# Patient Record
Sex: MALE | Race: WHITE
[De-identification: names, ages, dates, MRNs, and addresses within clinical notes are randomized per-mention and may not be internally consistent; named-entity substitution may affect disease eponyms.]

---

## 2018-05-08 PROBLEM — Z00.00 ENCOUNTER FOR PREVENTIVE HEALTH EXAMINATION: Status: ACTIVE | Noted: 2018-05-08

## 2018-05-23 ENCOUNTER — OTHER (OUTPATIENT)
Age: 73
End: 2018-05-23

## 2018-05-31 ENCOUNTER — APPOINTMENT (OUTPATIENT)
Dept: ORTHOPEDIC SURGERY | Facility: CLINIC | Age: 73
End: 2018-05-31
Payer: MEDICARE

## 2018-05-31 VITALS
TEMPERATURE: 98.1 F | SYSTOLIC BLOOD PRESSURE: 111 MMHG | WEIGHT: 253 LBS | HEART RATE: 93 BPM | BODY MASS INDEX: 34.27 KG/M2 | DIASTOLIC BLOOD PRESSURE: 65 MMHG | HEIGHT: 72 IN

## 2018-05-31 DIAGNOSIS — Z80.9 FAMILY HISTORY OF MALIGNANT NEOPLASM, UNSPECIFIED: ICD-10-CM

## 2018-05-31 DIAGNOSIS — Z82.61 FAMILY HISTORY OF ARTHRITIS: ICD-10-CM

## 2018-05-31 DIAGNOSIS — Z87.39 PERSONAL HISTORY OF OTHER DISEASES OF THE MUSCULOSKELETAL SYSTEM AND CONNECTIVE TISSUE: ICD-10-CM

## 2018-05-31 DIAGNOSIS — Z78.9 OTHER SPECIFIED HEALTH STATUS: ICD-10-CM

## 2018-05-31 DIAGNOSIS — M25.559 PAIN IN UNSPECIFIED HIP: ICD-10-CM

## 2018-05-31 DIAGNOSIS — Z87.891 PERSONAL HISTORY OF NICOTINE DEPENDENCE: ICD-10-CM

## 2018-05-31 PROCEDURE — 73502 X-RAY EXAM HIP UNI 2-3 VIEWS: CPT | Mod: RT

## 2018-05-31 PROCEDURE — 99204 OFFICE O/P NEW MOD 45 MIN: CPT

## 2018-07-30 ENCOUNTER — RX RENEWAL (OUTPATIENT)
Age: 73
End: 2018-07-30

## 2018-09-10 ENCOUNTER — RX RENEWAL (OUTPATIENT)
Age: 73
End: 2018-09-10

## 2019-02-21 ENCOUNTER — OTHER (OUTPATIENT)
Age: 74
End: 2019-02-21

## 2019-02-25 ENCOUNTER — FORM ENCOUNTER (OUTPATIENT)
Age: 74
End: 2019-02-25

## 2019-02-26 ENCOUNTER — OUTPATIENT (OUTPATIENT)
Dept: OUTPATIENT SERVICES | Facility: HOSPITAL | Age: 74
LOS: 1 days | End: 2019-02-26
Payer: MEDICARE

## 2019-02-26 ENCOUNTER — APPOINTMENT (OUTPATIENT)
Dept: ORTHOPEDIC SURGERY | Facility: CLINIC | Age: 74
End: 2019-02-26
Payer: MEDICARE

## 2019-02-26 ENCOUNTER — APPOINTMENT (OUTPATIENT)
Dept: ULTRASOUND IMAGING | Facility: CLINIC | Age: 74
End: 2019-02-26
Payer: MEDICARE

## 2019-02-26 VITALS
WEIGHT: 258 LBS | HEIGHT: 72 IN | BODY MASS INDEX: 34.95 KG/M2 | SYSTOLIC BLOOD PRESSURE: 154 MMHG | TEMPERATURE: 97.9 F | HEART RATE: 91 BPM | DIASTOLIC BLOOD PRESSURE: 77 MMHG

## 2019-02-26 DIAGNOSIS — M16.11 UNILATERAL PRIMARY OSTEOARTHRITIS, RIGHT HIP: ICD-10-CM

## 2019-02-26 PROCEDURE — 20611 DRAIN/INJ JOINT/BURSA W/US: CPT

## 2019-02-26 PROCEDURE — 20611 DRAIN/INJ JOINT/BURSA W/US: CPT | Mod: RT

## 2019-02-26 PROCEDURE — 99214 OFFICE O/P EST MOD 30 MIN: CPT

## 2019-02-26 PROCEDURE — 73502 X-RAY EXAM HIP UNI 2-3 VIEWS: CPT

## 2019-02-26 NOTE — PHYSICAL EXAM
[de-identified] : The patient appears well nourished  and in no apparent distress.  The patient is alert and oriented to person, place, and time.   Affect and mood appear normal. The head is normocephalic and atraumatic.  The eyes reveal normal sclera and extra ocular muscles are intact. The tongue is midline with no apparent lesions.  Skin shows normal turgor with no evidence of eczema or psoriasis.  No respiratory distress noted.  Sensation grossly intact.\par   [de-identified] : Exam of the right hip shows groin pain with attempting full extension of the leg, 10 degree flexion contracture, internal rotation is neutral and painful, external rotation is 60 degrees. 5/5 motor strength bilaterally distally. Sensation intact distally.  [de-identified] : Xray- AP pelvis and 2 views right hip shows moderate arthritis of the right hip.

## 2019-02-26 NOTE — DISCUSSION/SUMMARY
[de-identified] : The patient is a 73 year old male with moderate arthritis of the right hip. Conservative options were discussed. He was sent for ultrasound guided cortisone injection for both diagnostic and therapeutic purposes. He was recommended to keep a log of pain relief following the ultrasound guided cortisone injection. He was referred to Dr. Cho for a lumbar spine evaluation as he has some leg symptoms that may be related to spinal stenosis. He may be a future candidate for right hip replacement once conservative options have been exhausted. He will follow up 2 weeks following the ultrasound guided cortisone injection.

## 2019-02-26 NOTE — HISTORY OF PRESENT ILLNESS
[de-identified] : The patient is a 73 year old male being seen for evaluation of his right hip. He was originally seen in the office in May of 2018 for bilateral hip pain. He was diagnosed with osteoarthritis of both hips. He was treated conservatively with physical therapy. He did feel some mild relief with therapy. He continues to do his home exercise program and stretching. He discontinued pickle ball. Although he continues on Coumadin he does take Advil intermittently which helps symptoms. He is noting persistent pain most notable to the right groin. This is with weightbearing activities, especially walking. He does have some difficulty donning/doffing shoes and socks. He also reports bilateral quadriceps pain when transferring from sitting to standing. He presents today to discuss further treatment options.

## 2019-02-26 NOTE — ADDENDUM
[FreeTextEntry1] : This note was authored by Fox Cardoza working as a medical scribe for Dr. Malcolm Sage. The note was reviewed, edited, and revised by Dr. Malcolm Sage whom is in agreement with the exam findings, imaging findings, and treatment plan. 02/26/2019.

## 2019-04-30 ENCOUNTER — APPOINTMENT (OUTPATIENT)
Dept: ORTHOPEDIC SURGERY | Facility: CLINIC | Age: 74
End: 2019-04-30
Payer: MEDICARE

## 2019-04-30 VITALS
BODY MASS INDEX: 34.95 KG/M2 | DIASTOLIC BLOOD PRESSURE: 78 MMHG | SYSTOLIC BLOOD PRESSURE: 141 MMHG | WEIGHT: 258 LBS | HEIGHT: 72 IN | HEART RATE: 86 BPM

## 2019-04-30 DIAGNOSIS — M47.816 SPONDYLOSIS W/OUT MYELOPATHY OR RADICULOPATHY, LUMBAR REGION: ICD-10-CM

## 2019-04-30 DIAGNOSIS — Z86.79 PERSONAL HISTORY OF OTHER DISEASES OF THE CIRCULATORY SYSTEM: ICD-10-CM

## 2019-04-30 PROCEDURE — 99214 OFFICE O/P EST MOD 30 MIN: CPT

## 2019-04-30 PROCEDURE — 72100 X-RAY EXAM L-S SPINE 2/3 VWS: CPT

## 2019-04-30 NOTE — PHYSICAL EXAM
[Poor Appearance] : well-appearing [Acute Distress] : not in acute distress [de-identified] : CONSTITUTIONAL: The patient is a very pleasant individual who is well-nourished and who appears stated age.\par PSYCHIATRIC: The patient is alert and oriented X 3 and in no apparent distress, and participates with orthopedic evaluation well.\par HEAD: Atraumatic and is nonsyndromic in appearance.\par EENT: No visible thyromegaly, EOMI.\par RESPIRATORY: Respiratory rate is regular, not dyspneic on examination.\par LYMPHATICS: There is no inguinal lymphadenopathy\par INTEGUMENTARY: Skin is clean, dry, and intact about the bilateral lower extremities and lumbar spine.\par VASCULAR: There is brisk capillary refill about the bilateral lower extremities.\par NEUROLOGIC: There are no pathologic reflexes. There is no decrease in sensation of the bilateral lower extremities on Wartenberg pinwheel examination. Deep tendon reflexes are well maintained at 2+/4 of the bilateral lower extremities and are symmetric.\par MUSCULOSKELETAL: There is no visible muscular atrophy. Manual motor strength is well maintained in the bilateral lower extremities. Range of motion of lumbar spine is well maintained. The patient ambulates in a non-myelopathic manner. Negative tension sign and straight leg raise bilaterally. Quad extension, ankle dorsiflexion, EHL, plantar flexion, and ankle eversion are well preserved. Normal secondary orthopaedic exam of  knees and ankles \par \par Mechanically orientated low back pain. right hip findings / internal derangement characteristics. piriformis signs and symptoms as well as right greater trochanteric bursitis [de-identified] : X-ray of the lumbar spine taken today shows multiple levels of lumbar DDD.

## 2019-04-30 NOTE — REVIEW OF SYSTEMS
[Joint Pain] : joint pain [Negative] : Heme/Lymph [Feeling Tired] : fatigue [Urinary Frequency] : urinary frequency [Fever] : no fever [Chills] : no chills [Cough] : no cough [SOB on Exertion] : no shortness of breath on exertion

## 2019-04-30 NOTE — DISCUSSION/SUMMARY
[de-identified] : I have recommended that the pt continue with a conservative treatment plan. I think a large amount of his pain is coming from his hip, as he had great relief of his signs and symptoms from a right hip injection. Pt has been referred to physical therapy for decreased pain modalities, core strengthening modalities, soft tissue modalities, and physical modalities. I recommending continuing to follow up with pain management for hip injections. We spoke about the benefits to light aerobic conditioning as well. The patient will follow up in 6-8 weeks for a repeat clinical assessment. If his signs and symptoms persist, I will order a lumbar MRI.

## 2019-04-30 NOTE — ADDENDUM
[FreeTextEntry1] : Documented by García Marshall acting as a scribe for Dr. Paul Cho on 04/30/2019 . All medical record entries made by the Scribe were at my, Dr. Paul Cho, direction and personally dictated by me on 04/30/2019 . I have reviewed the chart and agree that the record accurately reflects my personal performance of the history, physical exam, assessment and plan. I have also personally directed, reviewed, and agreed with the chart.

## 2019-04-30 NOTE — HISTORY OF PRESENT ILLNESS
[de-identified] : 73 year old M presents with lumbar spine pain, especially when he is standing and cooking. This started 1 year ago. He describes it as achy, and is forced to sit. He sees Dr. Sage for his right hip, which also started about 1 year ago. He got a hip cortisone injection about 2 months ago which relieved his hip and back pain for 2-3 weeks. His pain is more intermittent than constant. About 10-12 years ago, he had severe back pain while walking in the city which forced him to sit. He was sent for an MRI and his pain spontaneously resolved. [Ataxia] : no ataxia [Incontinence] : no incontinence [Loss of Dexterity] : good dexterity [Urinary Ret.] : no urinary retention

## 2019-05-02 ENCOUNTER — OTHER (OUTPATIENT)
Age: 74
End: 2019-05-02

## 2019-05-07 ENCOUNTER — APPOINTMENT (OUTPATIENT)
Dept: ORTHOPEDIC SURGERY | Facility: CLINIC | Age: 74
End: 2019-05-07

## 2020-02-07 ENCOUNTER — RESULT REVIEW (OUTPATIENT)
Age: 75
End: 2020-02-07

## 2020-04-09 PROBLEM — M25.559 HIP PAIN: Status: ACTIVE | Noted: 2018-05-23

## 2020-05-31 ENCOUNTER — INPATIENT (INPATIENT)
Facility: HOSPITAL | Age: 75
LOS: 3 days | Discharge: HOME HEALTH | DRG: 308 | End: 2020-06-04
Attending: HOSPITALIST | Admitting: HOSPITALIST
Payer: MEDICARE

## 2020-05-31 VITALS
RESPIRATION RATE: 18 BRPM | HEART RATE: 190 BPM | OXYGEN SATURATION: 98 % | DIASTOLIC BLOOD PRESSURE: 107 MMHG | TEMPERATURE: 99 F | SYSTOLIC BLOOD PRESSURE: 141 MMHG

## 2020-05-31 DIAGNOSIS — I48.91 UNSPECIFIED ATRIAL FIBRILLATION: ICD-10-CM

## 2020-05-31 LAB
ALBUMIN SERPL ELPH-MCNC: 3.8 G/DL — SIGNIFICANT CHANGE UP (ref 3.3–5)
ALP SERPL-CCNC: 46 U/L — SIGNIFICANT CHANGE UP (ref 40–120)
ALT FLD-CCNC: 63 U/L — SIGNIFICANT CHANGE UP (ref 12–78)
ANION GAP SERPL CALC-SCNC: 8 MMOL/L — SIGNIFICANT CHANGE UP (ref 5–17)
APPEARANCE UR: CLEAR — SIGNIFICANT CHANGE UP
APTT BLD: 29.7 SEC — SIGNIFICANT CHANGE UP (ref 27.5–36.3)
AST SERPL-CCNC: 21 U/L — SIGNIFICANT CHANGE UP (ref 15–37)
BASOPHILS # BLD AUTO: 0 K/UL — SIGNIFICANT CHANGE UP (ref 0–0.2)
BASOPHILS NFR BLD AUTO: 0 % — SIGNIFICANT CHANGE UP (ref 0–2)
BILIRUB SERPL-MCNC: 0.9 MG/DL — SIGNIFICANT CHANGE UP (ref 0.2–1.2)
BILIRUB UR-MCNC: NEGATIVE — SIGNIFICANT CHANGE UP
BUN SERPL-MCNC: 55 MG/DL — HIGH (ref 7–23)
CALCIUM SERPL-MCNC: 9.2 MG/DL — SIGNIFICANT CHANGE UP (ref 8.5–10.1)
CHLORIDE SERPL-SCNC: 110 MMOL/L — HIGH (ref 96–108)
CO2 SERPL-SCNC: 24 MMOL/L — SIGNIFICANT CHANGE UP (ref 22–31)
COLOR SPEC: YELLOW — SIGNIFICANT CHANGE UP
CREAT SERPL-MCNC: 2.12 MG/DL — HIGH (ref 0.5–1.3)
DIFF PNL FLD: ABNORMAL
EOSINOPHIL # BLD AUTO: 0 K/UL — SIGNIFICANT CHANGE UP (ref 0–0.5)
EOSINOPHIL NFR BLD AUTO: 0 % — SIGNIFICANT CHANGE UP (ref 0–6)
GLUCOSE SERPL-MCNC: 141 MG/DL — HIGH (ref 70–99)
GLUCOSE UR QL: NEGATIVE MG/DL — SIGNIFICANT CHANGE UP
HCT VFR BLD CALC: 33.2 % — LOW (ref 39–50)
HGB BLD-MCNC: 10.2 G/DL — LOW (ref 13–17)
INR BLD: 1.48 RATIO — HIGH (ref 0.88–1.16)
KETONES UR-MCNC: NEGATIVE — SIGNIFICANT CHANGE UP
LEUKOCYTE ESTERASE UR-ACNC: NEGATIVE — SIGNIFICANT CHANGE UP
LYMPHOCYTES # BLD AUTO: 0.84 K/UL — LOW (ref 1–3.3)
LYMPHOCYTES # BLD AUTO: 10 % — LOW (ref 13–44)
MAGNESIUM SERPL-MCNC: 2.4 MG/DL — SIGNIFICANT CHANGE UP (ref 1.6–2.6)
MCHC RBC-ENTMCNC: 27.1 PG — SIGNIFICANT CHANGE UP (ref 27–34)
MCHC RBC-ENTMCNC: 30.7 GM/DL — LOW (ref 32–36)
MCV RBC AUTO: 88.3 FL — SIGNIFICANT CHANGE UP (ref 80–100)
MONOCYTES # BLD AUTO: 0.42 K/UL — SIGNIFICANT CHANGE UP (ref 0–0.9)
MONOCYTES NFR BLD AUTO: 5 % — SIGNIFICANT CHANGE UP (ref 2–14)
NEUTROPHILS # BLD AUTO: 7.12 K/UL — SIGNIFICANT CHANGE UP (ref 1.8–7.4)
NEUTROPHILS NFR BLD AUTO: 85 % — HIGH (ref 43–77)
NITRITE UR-MCNC: NEGATIVE — SIGNIFICANT CHANGE UP
NRBC # BLD: SIGNIFICANT CHANGE UP /100 WBCS (ref 0–0)
PH UR: 6 — SIGNIFICANT CHANGE UP (ref 5–8)
PHOSPHATE SERPL-MCNC: 2.6 MG/DL — SIGNIFICANT CHANGE UP (ref 2.5–4.5)
PLATELET # BLD AUTO: 194 K/UL — SIGNIFICANT CHANGE UP (ref 150–400)
POTASSIUM SERPL-MCNC: 3.6 MMOL/L — SIGNIFICANT CHANGE UP (ref 3.5–5.3)
POTASSIUM SERPL-SCNC: 3.6 MMOL/L — SIGNIFICANT CHANGE UP (ref 3.5–5.3)
PROT SERPL-MCNC: 6.5 GM/DL — SIGNIFICANT CHANGE UP (ref 6–8.3)
PROT UR-MCNC: 30 MG/DL
PROTHROM AB SERPL-ACNC: 16.6 SEC — HIGH (ref 10–12.9)
RBC # BLD: 3.76 M/UL — LOW (ref 4.2–5.8)
RBC # FLD: 16.3 % — HIGH (ref 10.3–14.5)
SODIUM SERPL-SCNC: 142 MMOL/L — SIGNIFICANT CHANGE UP (ref 135–145)
SP GR SPEC: 1.01 — SIGNIFICANT CHANGE UP (ref 1.01–1.02)
TSH SERPL-MCNC: 1.1 UU/ML — SIGNIFICANT CHANGE UP (ref 0.34–4.82)
UROBILINOGEN FLD QL: NEGATIVE MG/DL — SIGNIFICANT CHANGE UP
WBC # BLD: 8.38 K/UL — SIGNIFICANT CHANGE UP (ref 3.8–10.5)
WBC # FLD AUTO: 8.38 K/UL — SIGNIFICANT CHANGE UP (ref 3.8–10.5)

## 2020-05-31 PROCEDURE — 93010 ELECTROCARDIOGRAM REPORT: CPT | Mod: 77

## 2020-05-31 PROCEDURE — 85027 COMPLETE CBC AUTOMATED: CPT

## 2020-05-31 PROCEDURE — 84484 ASSAY OF TROPONIN QUANT: CPT

## 2020-05-31 PROCEDURE — 93005 ELECTROCARDIOGRAM TRACING: CPT

## 2020-05-31 PROCEDURE — 85025 COMPLETE CBC W/AUTO DIFF WBC: CPT

## 2020-05-31 PROCEDURE — 93010 ELECTROCARDIOGRAM REPORT: CPT

## 2020-05-31 PROCEDURE — 71045 X-RAY EXAM CHEST 1 VIEW: CPT | Mod: 26,CS

## 2020-05-31 PROCEDURE — 80048 BASIC METABOLIC PNL TOTAL CA: CPT

## 2020-05-31 PROCEDURE — 36415 COLL VENOUS BLD VENIPUNCTURE: CPT

## 2020-05-31 PROCEDURE — 93306 TTE W/DOPPLER COMPLETE: CPT

## 2020-05-31 PROCEDURE — 84100 ASSAY OF PHOSPHORUS: CPT

## 2020-05-31 PROCEDURE — 84443 ASSAY THYROID STIM HORMONE: CPT

## 2020-05-31 PROCEDURE — 80053 COMPREHEN METABOLIC PANEL: CPT

## 2020-05-31 PROCEDURE — 99223 1ST HOSP IP/OBS HIGH 75: CPT | Mod: AI

## 2020-05-31 PROCEDURE — 83735 ASSAY OF MAGNESIUM: CPT

## 2020-05-31 PROCEDURE — 86769 SARS-COV-2 COVID-19 ANTIBODY: CPT

## 2020-05-31 RX ORDER — PANTOPRAZOLE SODIUM 20 MG/1
40 TABLET, DELAYED RELEASE ORAL
Refills: 0 | Status: DISCONTINUED | OUTPATIENT
Start: 2020-05-31 | End: 2020-06-04

## 2020-05-31 RX ORDER — RIVAROXABAN 15 MG-20MG
15 KIT ORAL
Refills: 0 | Status: DISCONTINUED | OUTPATIENT
Start: 2020-05-31 | End: 2020-06-04

## 2020-05-31 RX ORDER — AMLODIPINE BESYLATE 2.5 MG/1
5 TABLET ORAL DAILY
Refills: 0 | Status: DISCONTINUED | OUTPATIENT
Start: 2020-05-31 | End: 2020-06-04

## 2020-05-31 RX ORDER — HEPARIN SODIUM 5000 [USP'U]/ML
6000 INJECTION INTRAVENOUS; SUBCUTANEOUS EVERY 6 HOURS
Refills: 0 | Status: DISCONTINUED | OUTPATIENT
Start: 2020-05-31 | End: 2020-05-31

## 2020-05-31 RX ORDER — ISOSORBIDE MONONITRATE 60 MG/1
30 TABLET, EXTENDED RELEASE ORAL DAILY
Refills: 0 | Status: DISCONTINUED | OUTPATIENT
Start: 2020-05-31 | End: 2020-06-04

## 2020-05-31 RX ORDER — DILTIAZEM HCL 120 MG
10 CAPSULE, EXT RELEASE 24 HR ORAL ONCE
Refills: 0 | Status: COMPLETED | OUTPATIENT
Start: 2020-05-31 | End: 2020-05-31

## 2020-05-31 RX ORDER — FUROSEMIDE 40 MG
40 TABLET ORAL DAILY
Refills: 0 | Status: DISCONTINUED | OUTPATIENT
Start: 2020-05-31 | End: 2020-06-04

## 2020-05-31 RX ORDER — DILTIAZEM HCL 120 MG
10 CAPSULE, EXT RELEASE 24 HR ORAL ONCE
Refills: 0 | Status: DISCONTINUED | OUTPATIENT
Start: 2020-05-31 | End: 2020-05-31

## 2020-05-31 RX ORDER — ASPIRIN/CALCIUM CARB/MAGNESIUM 324 MG
325 TABLET ORAL ONCE
Refills: 0 | Status: COMPLETED | OUTPATIENT
Start: 2020-05-31 | End: 2020-05-31

## 2020-05-31 RX ORDER — FUROSEMIDE 40 MG
40 TABLET ORAL DAILY
Refills: 0 | Status: DISCONTINUED | OUTPATIENT
Start: 2020-05-31 | End: 2020-05-31

## 2020-05-31 RX ORDER — HEPARIN SODIUM 5000 [USP'U]/ML
5000 INJECTION INTRAVENOUS; SUBCUTANEOUS ONCE
Refills: 0 | Status: DISCONTINUED | OUTPATIENT
Start: 2020-05-31 | End: 2020-05-31

## 2020-05-31 RX ORDER — HEPARIN SODIUM 5000 [USP'U]/ML
INJECTION INTRAVENOUS; SUBCUTANEOUS
Qty: 25000 | Refills: 0 | Status: DISCONTINUED | OUTPATIENT
Start: 2020-05-31 | End: 2020-05-31

## 2020-05-31 RX ADMIN — Medication 40 MILLIGRAM(S): at 21:45

## 2020-05-31 RX ADMIN — Medication 10 MILLIGRAM(S): at 20:30

## 2020-05-31 RX ADMIN — Medication 10 MILLIGRAM(S): at 21:39

## 2020-05-31 RX ADMIN — Medication 325 MILLIGRAM(S): at 21:39

## 2020-05-31 NOTE — H&P ADULT - NSHPPHYSICALEXAM_GEN_ALL_CORE
Vital Signs Last 24 Hrs  T(C): 36.9 (31 May 2020 22:45), Max: 37.1 (31 May 2020 20:07)  T(F): 98.4 (31 May 2020 22:45), Max: 98.8 (31 May 2020 20:07)  HR: 74 (31 May 2020 22:45) (61 - 191)  BP: 138/65 (31 May 2020 22:45) (131/62 - 162/97)  BP(mean): 118 (31 May 2020 20:07) (118 - 118)  RR: 16 (31 May 2020 22:45) (11 - 18)  SpO2: 99% (31 May 2020 22:45) (98% - 100%)

## 2020-05-31 NOTE — ED ADULT NURSE NOTE - OBJECTIVE STATEMENT
Ambulatory from home complaining of intermittent "racing heart" for the past week. Patient has PMH pacemaker insertion, Afib on Xarelto with multiple cardioversions and 4 ablations, follows up with Cardiologist Alix. Denies SOB at rest, only on exertion. Patient calm and cooperative, all other VSS, MD Jurado at bedside. Continuous cardiac monitoring in place, 18g PIV inserted to L AC, labs drawn and sent, medicated with 10mg of diltiazem and patients HR went from the high 190's to 70's after one IVP. Safety maintained, needs attended, will continue to monitor. Ambulatory from home complaining of intermittent "racing heart" for the past week. Patient has PMH pacemaker insertion, Afib on Xarelto with multiple cardioversions and 4 ablations, follows up with Cardiologist Alix. BL LE edema L>R. Denies SOB at rest, only on exertion. Patient calm and cooperative, all other VSS, MD Jurado at bedside. Continuous cardiac monitoring in place, 18g PIV inserted to L AC, labs drawn and sent, medicated with 10mg of diltiazem and patients HR went from the high 190's to 70's after one IVP. Safety maintained, needs attended, will continue to monitor.

## 2020-05-31 NOTE — ED PROVIDER NOTE - CLINICAL SUMMARY MEDICAL DECISION MAKING FREE TEXT BOX
Patient with afib RVR improved with 10 mg cardizem IV x 2, slightly elevated troponin, elevated BNP.  Given Laxix 40 mg IV.  Admit to medicine tele for further evaluation and managment.

## 2020-05-31 NOTE — ED PROVIDER NOTE - OBJECTIVE STATEMENT
73 y/o male with a PMHx of A-Fib s/p 4 ablations on Xarelto, pacemaker presents to the ED c/o chest pain today. Associated with diaphoresis, increased LE edema. Worse with exertion. Pt noted at home his heart rate was in 190s and his blood pressure was elevated, so came into ED. Pt states he has had similar symptoms in the past but has never been evaluated for it. Today pain was worse and lasted longer so came into ED. Denies SOB. States he has never felt a "flutter" in his chest. Notes he has had intentional weight loss in the past few months but in the past week has been eating and drinking water too much. On Rituxan, isosorbide. Cardio- Dr. Thomson.

## 2020-05-31 NOTE — ED STATDOCS - PROGRESS NOTE DETAILS
Avila AS for Dr. Crooks: 74 year old male with a PMHx of microangiopathic poly vasculitis presents to the ED for 2 hours of palpitations. No SOB, chest pain. Has had similar episodes in the past that felt like this but never came to the ED. HR noted to be in the 190s but pt with normal BP and normal mentation. EKG obtained. looks like SVT, pt to go to main for further evaluation, discussed with Dr. Jurado.

## 2020-05-31 NOTE — ED PROVIDER NOTE - MUSCULOSKELETAL, MLM
Spine appears normal, range of motion is not limited, no muscle or joint tenderness. +trace bilateral LE edema.

## 2020-05-31 NOTE — ED PROVIDER NOTE - CARE PLAN
Principal Discharge DX:	Atrial fibrillation with RVR  Secondary Diagnosis:	Elevated troponin  Secondary Diagnosis:	Edema

## 2020-05-31 NOTE — ED STATDOCS - NS_ ATTENDINGSCRIBEDETAILS _ED_A_ED_FT
ICharu MD,  performed the initial face to face bedside interview with this patient regarding history of present illness, review of symptoms and relevant past medical, social and family history.  the patient was deemed to need further medical attention and was transferred to the main section of the ER for further evaluation and care

## 2020-05-31 NOTE — H&P ADULT - HISTORY OF PRESENT ILLNESS
74 year old male patient with pertinent history of afib with prior ablations and cardioversions presented to the ED complaining of sudden onset of substernal chest pain associated with palpitations, sob, peripheral edema edema and significant  weight gain in the past 2-3 weeks. Patient endorses being compliant with his medications. Denies any recent fever, chills, recent travel, abdominal pain, nausea or vomiting.      In the ED patient found to be in SVT with heart rate in the 190s, Troponin of 0.084 and BNP of 5177

## 2020-05-31 NOTE — H&P ADULT - ASSESSMENT
74 year old male patient with chest pain and palpitations found to be in SVT with elevated troponin and BNP    -Admit to Tele      #Chest pain  -DDX: ACS, arrythmia, PE, musculoskeletal pain, GI  -monitor on tele  -serial troponin  -serial EKGs  -get morning echo  -cardiology to evaluate patient    #SVT  -pt given cardizem 10 mg x 10 in the ED  -currently in paced rhythm    #Concern for heart failure  -pt with peripheral edema, weight gain and elevated BNP  -s/p lasix in the ED  -get morning echo  -cardiology to evaluate pt  #Hx of afib  -on xarelto  -hold BB in light of bradycardia  -currently in paced rhythm   -s/p cardizem 10 mg x 2 in the ED  -hold metoprolol in light of occasional bradycardia  -PPM to be interrogated    #Concern for sick-sinus syndrome  -patient with labile heart-rate  -get morning echo  -get morning magnesium, phos  -cardiology to evaluate pt  -ppm to be interrogated    #CKD  -pt with baseline stage 3-4 CKD  -trend kidney function    #microscopic polyangiitis vasculitis  -on prednisone  - on rituxin. next dose due in 09/2020    #DVT ppx  -on xarelto

## 2020-05-31 NOTE — ED ADULT TRIAGE NOTE - CHIEF COMPLAINT QUOTE
Pt presents to ED c/o chest pain. Pt reports pain has been "on and off" past week, worse with exertion, +cough, SOB, denies fever, chills. Cardiologist Dr. Thomson.

## 2020-05-31 NOTE — ED PROVIDER NOTE - PROGRESS NOTE DETAILS
Scribe CD for ED attending, Doctor Adrien. Pt received 10mg Cardizem, rate controlled to 80s with a blood pressure of 152/79. Will continue to monitor.

## 2020-05-31 NOTE — ED PROVIDER NOTE - SKIN [+], MLM
Pt's wife states pt treated for gout, but both feet are still swollen. She is asking for a call back by 10 am today as they have other appointments. +diaphoresis

## 2020-06-01 DIAGNOSIS — I48.4 ATYPICAL ATRIAL FLUTTER: ICD-10-CM

## 2020-06-01 LAB
ALBUMIN SERPL ELPH-MCNC: 3.6 G/DL — SIGNIFICANT CHANGE UP (ref 3.3–5)
ALP SERPL-CCNC: 41 U/L — SIGNIFICANT CHANGE UP (ref 40–120)
ALT FLD-CCNC: 55 U/L — SIGNIFICANT CHANGE UP (ref 12–78)
ANION GAP SERPL CALC-SCNC: 6 MMOL/L — SIGNIFICANT CHANGE UP (ref 5–17)
AST SERPL-CCNC: 15 U/L — SIGNIFICANT CHANGE UP (ref 15–37)
BASOPHILS # BLD AUTO: 0.05 K/UL — SIGNIFICANT CHANGE UP (ref 0–0.2)
BASOPHILS NFR BLD AUTO: 0.5 % — SIGNIFICANT CHANGE UP (ref 0–2)
BILIRUB SERPL-MCNC: 1 MG/DL — SIGNIFICANT CHANGE UP (ref 0.2–1.2)
BUN SERPL-MCNC: 49 MG/DL — HIGH (ref 7–23)
CALCIUM SERPL-MCNC: 9.4 MG/DL — SIGNIFICANT CHANGE UP (ref 8.5–10.1)
CHLORIDE SERPL-SCNC: 110 MMOL/L — HIGH (ref 96–108)
CO2 SERPL-SCNC: 29 MMOL/L — SIGNIFICANT CHANGE UP (ref 22–31)
CREAT SERPL-MCNC: 1.95 MG/DL — HIGH (ref 0.5–1.3)
EOSINOPHIL # BLD AUTO: 0.07 K/UL — SIGNIFICANT CHANGE UP (ref 0–0.5)
EOSINOPHIL NFR BLD AUTO: 0.8 % — SIGNIFICANT CHANGE UP (ref 0–6)
GLUCOSE SERPL-MCNC: 101 MG/DL — HIGH (ref 70–99)
HCT VFR BLD CALC: 32.2 % — LOW (ref 39–50)
HGB BLD-MCNC: 9.8 G/DL — LOW (ref 13–17)
IMM GRANULOCYTES NFR BLD AUTO: 2 % — HIGH (ref 0–1.5)
LYMPHOCYTES # BLD AUTO: 1.43 K/UL — SIGNIFICANT CHANGE UP (ref 1–3.3)
LYMPHOCYTES # BLD AUTO: 15.6 % — SIGNIFICANT CHANGE UP (ref 13–44)
MAGNESIUM SERPL-MCNC: 2.6 MG/DL — SIGNIFICANT CHANGE UP (ref 1.6–2.6)
MCHC RBC-ENTMCNC: 27.2 PG — SIGNIFICANT CHANGE UP (ref 27–34)
MCHC RBC-ENTMCNC: 30.4 GM/DL — LOW (ref 32–36)
MCV RBC AUTO: 89.4 FL — SIGNIFICANT CHANGE UP (ref 80–100)
MONOCYTES # BLD AUTO: 0.67 K/UL — SIGNIFICANT CHANGE UP (ref 0–0.9)
MONOCYTES NFR BLD AUTO: 7.3 % — SIGNIFICANT CHANGE UP (ref 2–14)
NEUTROPHILS # BLD AUTO: 6.76 K/UL — SIGNIFICANT CHANGE UP (ref 1.8–7.4)
NEUTROPHILS NFR BLD AUTO: 73.8 % — SIGNIFICANT CHANGE UP (ref 43–77)
PHOSPHATE SERPL-MCNC: 3 MG/DL — SIGNIFICANT CHANGE UP (ref 2.5–4.5)
PLATELET # BLD AUTO: 172 K/UL — SIGNIFICANT CHANGE UP (ref 150–400)
POTASSIUM SERPL-MCNC: 3.7 MMOL/L — SIGNIFICANT CHANGE UP (ref 3.5–5.3)
POTASSIUM SERPL-SCNC: 3.7 MMOL/L — SIGNIFICANT CHANGE UP (ref 3.5–5.3)
PROT SERPL-MCNC: 6.1 GM/DL — SIGNIFICANT CHANGE UP (ref 6–8.3)
RBC # BLD: 3.6 M/UL — LOW (ref 4.2–5.8)
RBC # FLD: 16.1 % — HIGH (ref 10.3–14.5)
SARS-COV-2 RNA SPEC QL NAA+PROBE: SIGNIFICANT CHANGE UP
SODIUM SERPL-SCNC: 145 MMOL/L — SIGNIFICANT CHANGE UP (ref 135–145)
TROPONIN I SERPL-MCNC: 0.09 NG/ML — HIGH (ref 0.01–0.04)
TROPONIN I SERPL-MCNC: 0.09 NG/ML — HIGH (ref 0.01–0.04)
TSH SERPL-MCNC: 2.37 UU/ML — SIGNIFICANT CHANGE UP (ref 0.34–4.82)
WBC # BLD: 9.16 K/UL — SIGNIFICANT CHANGE UP (ref 3.8–10.5)
WBC # FLD AUTO: 9.16 K/UL — SIGNIFICANT CHANGE UP (ref 3.8–10.5)

## 2020-06-01 PROCEDURE — 93306 TTE W/DOPPLER COMPLETE: CPT | Mod: 26

## 2020-06-01 PROCEDURE — 93010 ELECTROCARDIOGRAM REPORT: CPT | Mod: 76

## 2020-06-01 PROCEDURE — 99222 1ST HOSP IP/OBS MODERATE 55: CPT

## 2020-06-01 PROCEDURE — 99233 SBSQ HOSP IP/OBS HIGH 50: CPT

## 2020-06-01 RX ORDER — ADENOSINE 3 MG/ML
12 INJECTION INTRAVENOUS ONCE
Refills: 0 | Status: COMPLETED | OUTPATIENT
Start: 2020-06-01 | End: 2020-06-01

## 2020-06-01 RX ORDER — AMIODARONE HYDROCHLORIDE 400 MG/1
150 TABLET ORAL ONCE
Refills: 0 | Status: COMPLETED | OUTPATIENT
Start: 2020-06-01 | End: 2020-06-01

## 2020-06-01 RX ORDER — DILTIAZEM HCL 120 MG
10 CAPSULE, EXT RELEASE 24 HR ORAL ONCE
Refills: 0 | Status: COMPLETED | OUTPATIENT
Start: 2020-06-01 | End: 2020-06-01

## 2020-06-01 RX ORDER — DILTIAZEM HCL 120 MG
5 CAPSULE, EXT RELEASE 24 HR ORAL
Qty: 125 | Refills: 0 | Status: DISCONTINUED | OUTPATIENT
Start: 2020-06-01 | End: 2020-06-01

## 2020-06-01 RX ORDER — DILTIAZEM HCL 120 MG
90 CAPSULE, EXT RELEASE 24 HR ORAL ONCE
Refills: 0 | Status: COMPLETED | OUTPATIENT
Start: 2020-06-01 | End: 2020-06-01

## 2020-06-01 RX ORDER — ADENOSINE 3 MG/ML
6 INJECTION INTRAVENOUS ONCE
Refills: 0 | Status: COMPLETED | OUTPATIENT
Start: 2020-06-01 | End: 2020-06-01

## 2020-06-01 RX ORDER — DILTIAZEM HCL 120 MG
180 CAPSULE, EXT RELEASE 24 HR ORAL DAILY
Refills: 0 | Status: DISCONTINUED | OUTPATIENT
Start: 2020-06-01 | End: 2020-06-01

## 2020-06-01 RX ORDER — SOTALOL HCL 120 MG
80 TABLET ORAL EVERY 12 HOURS
Refills: 0 | Status: DISCONTINUED | OUTPATIENT
Start: 2020-06-01 | End: 2020-06-02

## 2020-06-01 RX ADMIN — Medication 2.5 MILLIGRAM(S): at 06:33

## 2020-06-01 RX ADMIN — ADENOSINE 12 MILLIGRAM(S): 3 INJECTION INTRAVENOUS at 08:00

## 2020-06-01 RX ADMIN — ISOSORBIDE MONONITRATE 30 MILLIGRAM(S): 60 TABLET, EXTENDED RELEASE ORAL at 16:41

## 2020-06-01 RX ADMIN — PANTOPRAZOLE SODIUM 40 MILLIGRAM(S): 20 TABLET, DELAYED RELEASE ORAL at 06:33

## 2020-06-01 RX ADMIN — ADENOSINE 6 MILLIGRAM(S): 3 INJECTION INTRAVENOUS at 07:50

## 2020-06-01 RX ADMIN — AMIODARONE HYDROCHLORIDE 618 MILLIGRAM(S): 400 TABLET ORAL at 09:09

## 2020-06-01 RX ADMIN — AMLODIPINE BESYLATE 5 MILLIGRAM(S): 2.5 TABLET ORAL at 06:33

## 2020-06-01 RX ADMIN — RIVAROXABAN 15 MILLIGRAM(S): KIT at 16:41

## 2020-06-01 RX ADMIN — Medication 80 MILLIGRAM(S): at 21:11

## 2020-06-01 RX ADMIN — Medication 90 MILLIGRAM(S): at 06:49

## 2020-06-01 RX ADMIN — Medication 10 MILLIGRAM(S): at 06:48

## 2020-06-01 NOTE — CONSULT NOTE ADULT - SUBJECTIVE AND OBJECTIVE BOX
HPI:  74 year old male patient with PMH HTN, s/p PPM implant in 2019 by EP Dr Calles for high degree AVB, PAF with prior ablations x3 by Dr Garcia in [place] presented to the ED complaining of chest pain, palpitations and sob.  Patient endorses being compliant with his medications. Denies any recent fever, chills, recent travel, abdominal pain, nausea or vomiting.  In the ED patient found to be in SVT with heart rate in the 190s, Troponin of 0.084 and BNP of 5177 (31 May 2020 23:05), treated with adenosine without response, cardizem IV and drip and single dose of amiodarone.    He did not have his xarelto yesterday.      PAST MEDICAL & SURGICAL HISTORY:  MPA (microscopic polyangiitis)  Pacemaker  Atrial fibrillation      MEDICATIONS  (STANDING):  amLODIPine   Tablet 5 milliGRAM(s) Oral daily  diltiazem Infusion 5 mG/Hr (5 mL/Hr) IV Continuous <Continuous>  furosemide   Injectable 40 milliGRAM(s) IV Push daily  isosorbide   mononitrate ER Tablet (IMDUR) 30 milliGRAM(s) Oral daily  pantoprazole    Tablet 40 milliGRAM(s) Oral before breakfast  predniSONE Oral Tab/Cap - Peds 2.5 milliGRAM(s) Oral daily  rivaroxaban 15 milliGRAM(s) Oral with dinner    MEDICATIONS  (PRN):      Allergies    No Known Allergies      SOCIAL HISTORY:  denies smoking drinking or illicit drug use.         Vital Signs Last 24 Hrs  T(C): 36.4 (2020 10:52), Max: 37.1 (31 May 2020 20:07)  T(F): 97.5 (2020 10:52), Max: 98.8 (31 May 2020 20:07)  HR: 62 (2020 10:52) (61 - 191)  BP: 145/85 (2020 10:52) (131/62 - 187/73)  BP(mean): 118 (31 May 2020 20:07) (118 - 118)  RR: 18 (2020 10:52) (11 - 18)  SpO2: 97% (2020 10:52) (97% - 100%)      CONSTITUTIONAL: No fever, weight loss, chills, shakes, or fatigue  EYES: No eye pain, visual disturbances, or discharge  ENMT:  No difficulty hearing, tinnitus, vertigo; No sinus or throat pain  RESPIRATORY: No cough, wheezing, hemoptysis, or shortness of breath  CARDIOVASCULAR: + chest pain, dyspnea, palpitations,  nodizziness, syncope  GASTROINTESTINAL: No abdominal  pain, nausea, vomiting, diarrhea, constipation, melena or bright red blood.  GENITOURINARY: No dysuria, nocturia, hematuria, or urinary incontinence  NEUROLOGICAL: No headaches, memory loss, slurred speech, limb weakness, loss of strength, numbness, or tremors  SKIN: No itching, burning, rashes, or lesions   ENDOCRINE: No heat or cold intolerance, or hair loss  MUSCULOSKELETAL: No joint pain or swelling, muscle, back, or extremity pain  PSYCHIATRIC: No depression, anxiety, or difficulty sleeping  HEME/LYMPH: No easy bruising or bleeding gums      PHYSICAL EXAMINATION:    Constitutional: NAD, awake and alert, well-developed  HEENT: PERR, EOMI,  No oral cyananosis.  Neck:  supple,  No JVD  Respiratory: Breath sounds are clear bilaterally, No wheezing, rales or rhonchi  Cardiovascular: S1 and S2, regular rate and rhythm, no Murmurs, gallops or rubs  Gastrointestinal: Bowel Sounds present, soft, nontender.   Extremities: No peripheral edema. No clubbing or cyanosis.  Vascular: 2+ peripheral pulses  Neurological: A/O x 3, no focal deficits  Musculoskeletal: no calf tenderness.  Skin: No rashes.      LABS:                        9.8    9.16  )-----------( 172      ( 2020 06:24 )             32.2   06-    145  |  110<H>  |  49<H>  ----------------------------<  101<H>  3.7   |  29  |  1.95<H>    Ca    9.4      2020 06:24  Phos  3.0     06-  Mg     2.6     06-    TPro  6.1  /  Alb  3.6  /  TBili  1.0  /  DBili  x   /  AST  15  /  ALT  55  /  AlkPhos  41  06-01  LIVER FUNCTIONS - ( 2020 06:24 )  Alb: 3.6 g/dL / Pro: 6.1 gm/dL / ALK PHOS: 41 U/L / ALT: 55 U/L / AST: 15 U/L / GGT: x           PT/INR - ( 31 May 2020 20:19 )   PT: 16.6 sec;   INR: 1.48 ratio         PTT - ( 31 May 2020 20:19 )  PTT:29.7 secCARDIAC MARKERS ( 2020 06:24 )  0.085 ng/mL / x     / x     / x     / x      CARDIAC MARKERS ( 2020 00:37 )  0.087 ng/mL / x     / x     / x     / x      CARDIAC MARKERS ( 31 May 2020 20:19 )  0.084 ng/mL / x     / x     / x     / x          Urinalysis Basic - ( 31 May 2020 20:19 )    Color: Yellow / Appearance: Clear / S.015 / pH: x  Gluc: x / Ketone: Negative  / Bili: Negative / Urobili: Negative mg/dL   Blood: x / Protein: 30 mg/dL / Nitrite: Negative   Leuk Esterase: Negative / RBC: 6-10 /HPF / WBC Negative   Sq Epi: x / Non Sq Epi: Occasional / Bacteria: Occasional        EK2020 1057am - atrial flutter with ventricular paced rhythm 68bpm, QTc 425ms  TELEMETRY: atrial flutter with rates in 70-90bpm    CARDIAC TESTS:    PPM interrogation    Device : MDT Advisa  DR BRYANT           Battery Voltage: 3.0 V                               Estimated battery longevity: 6years    Underlying rhythm: atrial flutter    Mode: AAI<->DDD    Lower rate/UTR: 60/130    Leads:    RA    Sensin.9mV    Impedance:  399ohms    Threshold:  not determined    RV    Sensing: 15.6mV    Impedance:  437ohms    Threshold:  1.5V @ 0.4ms            Episodes: multiple episodes of rapid atrial and rapid ventricular rates starting 2020 likely atrial flutter with 1:1conduction, with ventricular rates 180-190bpm.   AF burden 23%    Changes made: None    Conclusion: Normal device function

## 2020-06-01 NOTE — CONSULT NOTE ADULT - PROBLEM SELECTOR RECOMMENDATION 9
echo to evaluate LVEF  will recommend antiarrhythmic therapy with sotalol to convert to sinus rhythm.   calculated creatinine clearance 50ml/min. QTc at baseline 425ms.

## 2020-06-01 NOTE — PROGRESS NOTE ADULT - ASSESSMENT
74 year old male patient with chest pain and palpitations found to be in SVT with elevated troponin and BNP  -Admit to Tele    #Chest pain r/o ACS  h/o AFIB s/p ablation  now with SVT   SSS s/p PPM   probable CHF - type not known   CKD, at baseline   microscopic polyangiitis vasculitis    Plan:  -monitor on tele  - started on amiodarone  - stop cardizem infusion, converted  #Concern for heart failure  -pt with peripheral edema, weight gain and elevated BNP  -s/p lasix in the ED, noted ECHO results - diastolic CHF     #Hx of afib  -on xarelto  -hold BB in light of bradycardia  -currently in paced rhythm   -s/p cardizem 10 mg x 2 in the ED  -hold metoprolol in light of occasional bradycardia  -PPM to be interrogated        #CKD  -pt with baseline stage 3-4 CKD  -trend kidney function      -on prednisone  - on rituxin. next dose due in 09/2020    #DVT ppx  -on xarelto 74 year old male patient with chest pain and palpitations found to be in SVT with elevated troponin and BNP  -Admit to Tele    Chest pain r/o ACS  h/o AFIB s/p ablation  now with SVT   SSS s/p PPM   probable CHF - type not known   CKD, at baseline   microscopic polyangiitis vasculitis    Plan:  #Chest pain   -monitor on tele  - stop cardizem infusion, converted  -EP recs sotalol  -on xarelto  Pt seen by CArd and EP     #Concern for heart failure  -pt with peripheral edema, weight gain and elevated BNP  -s/p lasix in the ED, noted ECHO results - diastolic CHF     #CKD  -pt with baseline stage 3-4 CKD  -trend kidney function    #microscopic polyangiitis vasculitis  -on prednisone  - on rituxin. next dose due in 09/2020    #DVT/Stroke ppx  -on xarelto     discussed with RN

## 2020-06-01 NOTE — ED ADULT NURSE REASSESSMENT NOTE - NS ED NURSE REASSESS COMMENT FT1
Pt received from previous RN.  Pt aaox3  No c/o pain or discomfort, in no apparent distress.  Plan of care, transition from ED to admitted status discussed with pt.  All questions answered to pt satisfaction.   Pt identified as fall w harm risk. Bed in lowest locked position, call bell within patient reach.  Pt instructed re: call bell use. Teaches back proper use and verbalizes agreement to utilize bell and wait safely for staff for assistance with needs. Pt clean and dry, all belongings in reach. Will continue hourly rounding.

## 2020-06-01 NOTE — CONSULT NOTE ADULT - SUBJECTIVE AND OBJECTIVE BOX
CHIEF COMPLAINT: Patient is a 74y old  Male who presents with a chief complaint of Chest pain  Elevated troponin (31 May 2020 23:05)      HPI:  74 year old male patient with pertinent history of afib with prior ablations and cardioversions presented to the ED complaining of sudden onset of substernal chest pain associated with palpitations, sob, peripheral edema edema and significant  weight gain in the past 2-3 weeks. Patient endorses being compliant with his medications. Denies any recent fever, chills, recent travel, abdominal pain, nausea or vomiting.      In the ED patient found to be in SVT with heart rate in the 190s, Troponin of 0.084 and BNP of 5177 (31 May 2020 23:05)      PMHx: PAST MEDICAL & SURGICAL HISTORY:  MPA (microscopic polyangiitis)  Pacemaker  Atrial fibrillation        Soc Hx:  no toxic habits       Allergies: Allergies    No Known Allergies    Intolerances          REVIEW OF SYSTEMS:    CONSTITUTIONAL: No weakness, fevers or chills  EYES/ENT: No visual changes;  No vertigo or throat pain   NECK: No pain or stiffness  RESPIRATORY: No cough, wheezing, hemoptysis; No shortness of breath  CARDIOVASCULAR: No chest pain or palpitations  GASTROINTESTINAL: No abdominal or epigastric pain. No nausea, vomiting, or hematemesis; No diarrhea or constipation. No melena or hematochezia.  GENITOURINARY: No dysuria, frequency or hematuria  NEUROLOGICAL: No numbness or weakness  SKIN: No itching, burning, rashes, or lesions   All other review of systems is negative unless indicated above    Vital Signs Last 24 Hrs  T(C): 36.9 (01 Jun 2020 06:53), Max: 37.1 (31 May 2020 20:07)  T(F): 98.4 (01 Jun 2020 06:53), Max: 98.8 (31 May 2020 20:07)  HR: 73 (01 Jun 2020 06:53) (61 - 191)  BP: 150/91 (01 Jun 2020 06:53) (131/62 - 187/73)  BP(mean): 118 (31 May 2020 20:07) (118 - 118)  RR: 14 (01 Jun 2020 06:53) (11 - 18)  SpO2: 100% (01 Jun 2020 06:53) (98% - 100%)    I&O's Summary          PHYSICAL EXAM:   Constitutional: NAD, awake and alert, well-developed  HEENT: PERR, EOMI, Normal Hearing, MMM  Neck: Soft and supple, No LAD, No JVD  Respiratory: Breath sounds are clear bilaterally, No wheezing, rales or rhonchi  Cardiovascular: S1 and S2, regular rate and rhythm, no Murmurs, gallops or rubs  Gastrointestinal: Bowel Sounds present, soft, nontender, nondistended, no guarding, no rebound  Extremities: No peripheral edema  Vascular: 2+ peripheral pulses  Neurological: A/O x 3, no focal deficits  Musculoskeletal: 5/5 strength b/l upper and lower extremities  Skin: No rashes    MEDICATIONS:  MEDICATIONS  (STANDING):  aMIOdarone IVPB 150 milliGRAM(s) IV Intermittent once  amLODIPine   Tablet 5 milliGRAM(s) Oral daily  diltiazem Infusion 5 mG/Hr (5 mL/Hr) IV Continuous <Continuous>  furosemide   Injectable 40 milliGRAM(s) IV Push daily  isosorbide   mononitrate ER Tablet (IMDUR) 30 milliGRAM(s) Oral daily  pantoprazole    Tablet 40 milliGRAM(s) Oral before breakfast  predniSONE Oral Tab/Cap - Peds 2.5 milliGRAM(s) Oral daily  rivaroxaban 15 milliGRAM(s) Oral with dinner      LABS: All Labs Reviewed:                        9.8    9.16  )-----------( 172      ( 01 Jun 2020 06:24 )             32.2     06-01    145  |  110<H>  |  49<H>  ----------------------------<  101<H>  3.7   |  29  |  1.95<H>    Ca    9.4      01 Jun 2020 06:24  Phos  3.0     06-01  Mg     2.6     06-01    TPro  6.1  /  Alb  3.6  /  TBili  1.0  /  DBili  x   /  AST  15  /  ALT  55  /  AlkPhos  41  06-01    PT/INR - ( 31 May 2020 20:19 )   PT: 16.6 sec;   INR: 1.48 ratio         PTT - ( 31 May 2020 20:19 )  PTT:29.7 sec  CARDIAC MARKERS ( 01 Jun 2020 06:24 )  0.085 ng/mL / x     / x     / x     / x      CARDIAC MARKERS ( 01 Jun 2020 00:37 )  0.087 ng/mL / x     / x     / x     / x      CARDIAC MARKERS ( 31 May 2020 20:19 )  0.084 ng/mL / x     / x     / x     / x          Serum Pro-Brain Natriuretic Peptide: 5177 pg/mL (05-31 @ 20:19)      EKG: SVT  lateral ST changes     Telemetry: SVT -180 wiht varible block, PVCs     ECHO: pending

## 2020-06-01 NOTE — ED ADULT NURSE REASSESSMENT NOTE - NS ED NURSE REASSESS COMMENT FT1
after amiodorone drip was completed, hr improved to 60's on monitor. PT rhythm is irregular. PT resting calm and comfortable w/o distress. VSS.

## 2020-06-01 NOTE — ED ADULT NURSE REASSESSMENT NOTE - NS ED NURSE REASSESS COMMENT FT1
Received patient at approximately 10:30am from JAKOB Santos. Pt a&ox3, no complaints at this time. VSS. Pt evaluated by EP, repeat ekg being performed at this time. Will continue to monitor for safety and comfort.

## 2020-06-01 NOTE — ED ADULT NURSE REASSESSMENT NOTE - NS ED NURSE REASSESS COMMENT FT1
Patient stood up to go use the urinal and went back into rapid Afib RVR. ED MD Ty ordered 10mg IVP Cardizem and 90mg PO to better control rate. Afib broke a few minutes after push dose. Denies CP/SOB. Would like to speak with MD Thomson. Safety maintained, needs attended, will continue to monitor.

## 2020-06-01 NOTE — PROGRESS NOTE ADULT - SUBJECTIVE AND OBJECTIVE BOX
74 year old male patient with pertinent history of afib with prior ablations and cardioversions presented to the ED complaining of sudden onset of substernal chest pain associated with palpitations, sob, peripheral edema edema and significant  weight gain in the past 2-3 weeks. Patient endorses being compliant with his medications. Denies any recent fever, chills, recent travel, abdominal pain, nausea or vomiting.  In the ED patient found to be in SVT with heart rate in the 190s, Troponin of 0.084 and BNP of 5177    6/1 - HR better and he feels better - no cp palps sob; does report some lightheadedness when at home  PHYSICAL EXAM:  GENERAL: NAD, able to lie flat in bed  HEAD:  Atraumatic, Normocephalic  EYES: EOMI, PERRLA, normal sclera  ENT: Moist mucous membranes  NECK: Supple, No JVD, no nuchal rigidity  CHEST/LUNG: Clear to auscultation bilaterally; No rales, rhonchi, wheezing, or rubs. Unlabored respirations  HEART: Regular rate and rhythm; No murmurs, rubs, or gallops  ABDOMEN: Bowel sounds present; Soft, Nontender, Nondistended. No hepatomegaly  EXTREMITIES:  no pitting bilaterally  NERVOUS SYSTEM:  Alert & Oriented X3, speech clear. No focal motor or sensory deficits  MSK: FROM all 4 extremities, full and equal strength  SKIN: No rashes or lesions    LABS: All Labs Reviewed:                        9.8    9.16  )-----------( 172      ( 01 Jun 2020 06:24 )             32.2     06-01    145  |  110<H>  |  49<H>  ----------------------------<  101<H>  3.7   |  29  |  1.95<H>    Ca    9.4      01 Jun 2020 06:24  Phos  3.0     06-01  Mg     2.6     06-01  TPro  6.1  /  Alb  3.6  /  TBili  1.0  /  DBili  x   /  AST  15  /  ALT  55  /  AlkPhos  41  06-01  PT/INR - ( 31 May 2020 20:19 )   PT: 16.6 sec;   INR: 1.48 ratio        PTT - ( 31 May 2020 20:19 )  PTT:29.7 sec  CARDIAC MARKERS ( 01 Jun 2020 06:24 )  0.085 ng/mL / x     / x     / x     / x      CARDIAC MARKERS ( 01 Jun 2020 00:37 )  0.087 ng/mL / x     / x     / x     / x      CARDIAC MARKERS ( 31 May 2020 20:19 )  0.084 ng/mL / x     / x     / x     / x        Tele 6/1 shows afib rate controlled  < from: TTE Echo Complete w/o contrast w/ Doppler (06.01.20 @ 13:51) >   Fibrocalcific changes noted to the mitral valve leaflets with preserved   leaflet excursion. Mild to Moderate mitral regurgitation is present. EA   reversal of the mitral inflow consistent with reduced compliance of the   left ventricle.   Fibrocalcific changes noted to the Aortic valve leaflets with preserved   leaflet excursion.   Moderate (2+) aortic regurgitation is present.   The tricuspid valve leaflets are well seen and appear thin and pliable   with preserved leaflets excursion. Mild to Moderate Tricuspid   regurgitation is present.   Left ventricular wall motion is normal.   The left ventricleis normal in size.   Estimated left ventricular ejection fraction is 50-55 %.   The right atrium appears mildly dilated.   A device wire is seen in the RV and RA.   The right ventricle is normal in size, wall thickness, wall motion, and   contractility based on TAPSE and tissue doppler.    < end of copied text >        amLODIPine   Tablet 5 milliGRAM(s) Oral daily  furosemide   Injectable 40 milliGRAM(s) IV Push daily  isosorbide   mononitrate ER Tablet (IMDUR) 30 milliGRAM(s) Oral daily  pantoprazole    Tablet 40 milliGRAM(s) Oral before breakfast  predniSONE Oral Tab/Cap - Peds 2.5 milliGRAM(s) Oral daily  rivaroxaban 15 milliGRAM(s) Oral with dinner  sotalol 80 milliGRAM(s) Oral every 12 hours 74 year old male patient with pertinent history of afib with prior ablations and cardioversions presented to the ED complaining of sudden onset of substernal chest pain associated with palpitations, sob, peripheral edema edema and significant  weight gain in the past 2-3 weeks. Patient endorses being compliant with his medications. Denies any recent fever, chills, recent travel, abdominal pain, nausea or vomiting.  In the ED patient found to be in SVT with heart rate in the 190s, Troponin of 0.084 and BNP of 5177    6/1 - HR better and he feels better - no cp palps sob; does report some lightheadedness when at home  PHYSICAL EXAM:  GENERAL: NAD, able to lie flat in bed  HEAD:  Atraumatic, Normocephalic  EYES: EOMI, PERRLA, normal sclera  ENT: Moist mucous membranes  NECK: Supple, No JVD, no nuchal rigidity  CHEST/LUNG: Clear to auscultation bilaterally; No rales, rhonchi, wheezing, or rubs. Unlabored respirations  HEART: Regular rate and rhythm; No murmurs, rubs, or gallops  ABDOMEN: Bowel sounds present; Soft, Nontender, Nondistended. No hepatomegaly  EXTREMITIES:  no pitting bilaterally  NERVOUS SYSTEM:  Alert & Oriented X3, speech clear. No focal motor or sensory deficits  MSK: FROM all 4 extremities, full and equal strength  SKIN: No rashes or lesions    LABS: All Labs Reviewed:                        9.8    9.16  )-----------( 172      ( 01 Jun 2020 06:24 )             32.2     06-01    145  |  110<H>  |  49<H>  ----------------------------<  101<H>  3.7   |  29  |  1.95<H>    Ca    9.4      01 Jun 2020 06:24  Phos  3.0     06-01  Mg     2.6     06-01  TPro  6.1  /  Alb  3.6  /  TBili  1.0  /  DBili  x   /  AST  15  /  ALT  55  /  AlkPhos  41  06-01  PT/INR - ( 31 May 2020 20:19 )   PT: 16.6 sec;   INR: 1.48 ratio        PTT - ( 31 May 2020 20:19 )  PTT:29.7 sec  CARDIAC MARKERS ( 01 Jun 2020 06:24 )  0.085 ng/mL / x     / x     / x     / x      CARDIAC MARKERS ( 01 Jun 2020 00:37 )  0.087 ng/mL / x     / x     / x     / x      CARDIAC MARKERS ( 31 May 2020 20:19 )  0.084 ng/mL / x     / x     / x     / x        Tele 6/1 shows afib rate controlled  < from: TTE Echo Complete w/o contrast w/ Doppler (06.01.20 @ 13:51) >   Fibrocalcific changes noted to the mitral valve leaflets with preserved   leaflet excursion. Mild to Moderate mitral regurgitation is present. EA   reversal of the mitral inflow consistent with reduced compliance of the   left ventricle.   Fibrocalcific changes noted to the Aortic valve leaflets with preserved   leaflet excursion.   Moderate (2+) aortic regurgitation is present.   The tricuspid valve leaflets are well seen and appear thin and pliable   with preserved leaflets excursion. Mild to Moderate Tricuspid   regurgitation is present.   Left ventricular wall motion is normal.   The left ventricleis normal in size.   Estimated left ventricular ejection fraction is 50-55 %.   The right atrium appears mildly dilated.   A device wire is seen in the RV and RA.   The right ventricle is normal in size, wall thickness, wall motion, and   contractility based on TAPSE and tissue doppler.    CXR rev by me - no infiltarte       amLODIPine   Tablet 5 milliGRAM(s) Oral daily  furosemide   Injectable 40 milliGRAM(s) IV Push daily  isosorbide   mononitrate ER Tablet (IMDUR) 30 milliGRAM(s) Oral daily  pantoprazole    Tablet 40 milliGRAM(s) Oral before breakfast  predniSONE Oral Tab/Cap - Peds 2.5 milliGRAM(s) Oral daily  rivaroxaban 15 milliGRAM(s) Oral with dinner  sotalol 80 milliGRAM(s) Oral every 12 hours

## 2020-06-02 LAB
ANION GAP SERPL CALC-SCNC: 3 MMOL/L — LOW (ref 5–17)
BUN SERPL-MCNC: 35 MG/DL — HIGH (ref 7–23)
CALCIUM SERPL-MCNC: 9.8 MG/DL — SIGNIFICANT CHANGE UP (ref 8.5–10.1)
CHLORIDE SERPL-SCNC: 110 MMOL/L — HIGH (ref 96–108)
CO2 SERPL-SCNC: 30 MMOL/L — SIGNIFICANT CHANGE UP (ref 22–31)
CREAT SERPL-MCNC: 2.01 MG/DL — HIGH (ref 0.5–1.3)
CULTURE RESULTS: SIGNIFICANT CHANGE UP
GLUCOSE SERPL-MCNC: 103 MG/DL — HIGH (ref 70–99)
HCT VFR BLD CALC: 36 % — LOW (ref 39–50)
HGB BLD-MCNC: 10.7 G/DL — LOW (ref 13–17)
MAGNESIUM SERPL-MCNC: 2.5 MG/DL — SIGNIFICANT CHANGE UP (ref 1.6–2.6)
MCHC RBC-ENTMCNC: 26.7 PG — LOW (ref 27–34)
MCHC RBC-ENTMCNC: 29.7 GM/DL — LOW (ref 32–36)
MCV RBC AUTO: 89.8 FL — SIGNIFICANT CHANGE UP (ref 80–100)
PLATELET # BLD AUTO: 209 K/UL — SIGNIFICANT CHANGE UP (ref 150–400)
POTASSIUM SERPL-MCNC: 3.9 MMOL/L — SIGNIFICANT CHANGE UP (ref 3.5–5.3)
POTASSIUM SERPL-SCNC: 3.9 MMOL/L — SIGNIFICANT CHANGE UP (ref 3.5–5.3)
RBC # BLD: 4.01 M/UL — LOW (ref 4.2–5.8)
RBC # FLD: 16.8 % — HIGH (ref 10.3–14.5)
SODIUM SERPL-SCNC: 143 MMOL/L — SIGNIFICANT CHANGE UP (ref 135–145)
SPECIMEN SOURCE: SIGNIFICANT CHANGE UP
WBC # BLD: 8.82 K/UL — SIGNIFICANT CHANGE UP (ref 3.8–10.5)
WBC # FLD AUTO: 8.82 K/UL — SIGNIFICANT CHANGE UP (ref 3.8–10.5)

## 2020-06-02 PROCEDURE — 93010 ELECTROCARDIOGRAM REPORT: CPT

## 2020-06-02 PROCEDURE — 99233 SBSQ HOSP IP/OBS HIGH 50: CPT

## 2020-06-02 PROCEDURE — 99232 SBSQ HOSP IP/OBS MODERATE 35: CPT

## 2020-06-02 RX ORDER — FONDAPARINUX SODIUM 2.5 MG/.5ML
1 INJECTION, SOLUTION SUBCUTANEOUS
Qty: 0 | Refills: 0 | DISCHARGE

## 2020-06-02 RX ORDER — PANTOPRAZOLE SODIUM 20 MG/1
1 TABLET, DELAYED RELEASE ORAL
Qty: 0 | Refills: 0 | DISCHARGE

## 2020-06-02 RX ORDER — AMIODARONE HYDROCHLORIDE 400 MG/1
400 TABLET ORAL EVERY 12 HOURS
Refills: 0 | Status: DISCONTINUED | OUTPATIENT
Start: 2020-06-02 | End: 2020-06-04

## 2020-06-02 RX ORDER — MIRABEGRON 50 MG/1
1 TABLET, EXTENDED RELEASE ORAL
Qty: 0 | Refills: 0 | DISCHARGE

## 2020-06-02 RX ORDER — ISOSORBIDE MONONITRATE 60 MG/1
1 TABLET, EXTENDED RELEASE ORAL
Qty: 0 | Refills: 0 | DISCHARGE

## 2020-06-02 RX ORDER — RITUXIMAB 10 MG/ML
0 INJECTION, SOLUTION INTRAVENOUS
Qty: 0 | Refills: 0 | DISCHARGE

## 2020-06-02 RX ORDER — AMIODARONE HYDROCHLORIDE 400 MG/1
TABLET ORAL
Refills: 0 | Status: DISCONTINUED | OUTPATIENT
Start: 2020-06-02 | End: 2020-06-04

## 2020-06-02 RX ADMIN — Medication 2.5 MILLIGRAM(S): at 12:42

## 2020-06-02 RX ADMIN — AMLODIPINE BESYLATE 5 MILLIGRAM(S): 2.5 TABLET ORAL at 12:45

## 2020-06-02 RX ADMIN — Medication 40 MILLIGRAM(S): at 12:44

## 2020-06-02 RX ADMIN — ISOSORBIDE MONONITRATE 30 MILLIGRAM(S): 60 TABLET, EXTENDED RELEASE ORAL at 12:44

## 2020-06-02 RX ADMIN — RIVAROXABAN 15 MILLIGRAM(S): KIT at 17:40

## 2020-06-02 RX ADMIN — AMIODARONE HYDROCHLORIDE 400 MILLIGRAM(S): 400 TABLET ORAL at 21:13

## 2020-06-02 RX ADMIN — PANTOPRAZOLE SODIUM 40 MILLIGRAM(S): 20 TABLET, DELAYED RELEASE ORAL at 12:43

## 2020-06-02 NOTE — CONSULT NOTE ADULT - SUBJECTIVE AND OBJECTIVE BOX
74 year old male patient with pertinent history of afib with prior ablations and cardioversions presented to the ED complaining of sudden onset of substernal chest pain associated with palpitations, sob, peripheral edema edema and significant  weight gain in the past 2-3 weeks. Patient endorses being compliant with his medications. Denies any recent fever, chills, recent travel, abdominal pain, nausea or vomiting.    Pt is known to my colleague, Dr. Keshia Jha for anemia and history of   ANCA positive vascultis, renal failure, anemia, on rituxan and prednisone.    Has also been on Aranesp q3 weeks since 4/15/2020, last dose given on 5/27/2020.  Baseline Hgb 9.9 on 5/27/2020 in our office      PAST MEDICAL HISTORY:  Atrial fibrillation     MPA (microscopic polyangiitis)     Pacemaker.    Social History:  Social History (marital status, living situation, occupation, tobacco use, alcohol and drug use, and sexual history): Denies smoking, alcohol or drug use	    Tobacco Screening:  · Core Measure Site	Yes	  · Has the patient used tobacco in the past 30 days?	No 74 year old male patient with pertinent history of afib with prior ablations and cardioversions presented to the ED complaining of sudden onset of substernal chest pain associated with palpitations, sob, peripheral edema edema and significant  weight gain in the past 2-3 weeks. Patient endorses being compliant with his medications. Denies any recent fever, chills, recent travel, abdominal pain, nausea or vomiting.    Pt is known to my colleague, Dr. Keshia Jha for anemia and history of   ANCA positive vascultis, renal failure, anemia, on rituxan and prednisone.    Has also been on Aranesp q3 weeks since 4/15/2020, last dose given on 5/27/2020.  Baseline Hgb 9.9 on 5/27/2020 in our office      PAST MEDICAL HISTORY:  Atrial fibrillation     MPA (microscopic polyangiitis)     Pacemaker.    Social History:  Social History (marital status, living situation, occupation, tobacco use, alcohol and drug use, and sexual history): Denies smoking, alcohol or drug use	    Tobacco Screening:  · Core Measure Site	Yes	  · Has the patient used tobacco in the past 30 days?	No	      aMIOdarone    Tablet 400 milliGRAM(s) Oral every 12 hours  aMIOdarone    Tablet   Oral   amLODIPine   Tablet 5 milliGRAM(s) Oral daily  furosemide   Injectable 40 milliGRAM(s) IV Push daily  isosorbide   mononitrate ER Tablet (IMDUR) 30 milliGRAM(s) Oral daily  pantoprazole    Tablet 40 milliGRAM(s) Oral before breakfast  predniSONE Oral Tab/Cap - Peds 2.5 milliGRAM(s) Oral daily  rivaroxaban 15 milliGRAM(s) Oral with dinner      No Known Allergies      ROS otherwise negative     T(C): 36.8 (06-02-20 @ 16:05), Max: 36.8 (06-01-20 @ 16:37)  HR: 93 (06-02-20 @ 16:05) (62 - 101)  BP: 127/72 (06-02-20 @ 16:05) (126/71 - 156/92)  RR: 18 (06-02-20 @ 11:06) (17 - 18)  SpO2: 96% (06-02-20 @ 16:05) (96% - 98%)  PHYSICAL EXAM  Gen:  laying in bed, nad  H:  anicteric, eomi                          10.7   8.82  )-----------( 209      ( 02 Jun 2020 08:13 )             36.0                         9.8    9.16  )-----------( 172      ( 01 Jun 2020 06:24 )             32.2                         10.2   8.38  )-----------( 194      ( 31 May 2020 20:19 )             33.2   06-02    143  |  110<H>  |  35<H>  ----------------------------<  103<H>  3.9   |  30  |  2.01<H>    Ca    9.8      02 Jun 2020 08:13  Phos  3.0     06-01  Mg     2.5     06-02    TPro  6.1  /  Alb  3.6  /  TBili  1.0  /  DBili  x   /  AST  15  /  ALT  55  /  AlkPhos  41  06-01

## 2020-06-02 NOTE — PROGRESS NOTE ADULT - SUBJECTIVE AND OBJECTIVE BOX
HPI:  74 year old male patient with PMH HTN, s/p PPM implant in 2019 by EP Dr Calles for high degree AVB, PAF with prior ablations x3 by Dr Garcia in [place] presented to the ED complaining of chest pain, palpitations and sob.  Patient endorses being compliant with his medications. Denies any recent fever, chills, recent travel, abdominal pain, nausea or vomiting.  In the ED patient found to be in SVT with heart rate in the 190s, Troponin of 0.084 and BNP of 5177 (31 May 2020 23:05), treated with adenosine without response, cardizem IV and drip and single dose of amiodarone.      EKG:  AF rate 91bpm, QTc 514ms (post sotalol initiation)	   TELE: noted with episode of polymorphic wide complex tachy suspicious for torsades    MEDICATIONS  (STANDING):  aMIOdarone    Tablet 400 milliGRAM(s) Oral every 12 hours  aMIOdarone    Tablet   Oral   amLODIPine   Tablet 5 milliGRAM(s) Oral daily  furosemide   Injectable 40 milliGRAM(s) IV Push daily  isosorbide   mononitrate ER Tablet (IMDUR) 30 milliGRAM(s) Oral daily  pantoprazole    Tablet 40 milliGRAM(s) Oral before breakfast  predniSONE Oral Tab/Cap - Peds 2.5 milliGRAM(s) Oral daily  rivaroxaban 15 milliGRAM(s) Oral with dinner    MEDICATIONS  (PRN):      Allergies    No Known Allergies      Vital Signs Last 24 Hrs  T(C): 36.7 (2020 11:06), Max: 36.9 (2020 15:27)  T(F): 98.1 (2020 11:06), Max: 98.5 (2020 15:27)  HR: 96 (2020 11:06) (62 - 101)  BP: 135/62 (2020 11:06) (126/71 - 156/92)  BP(mean): --  RR: 18 (2020 11:06) (17 - 18)  SpO2: 98% (2020 11:06) (97% - 98%)    PHYSICAL EXAMINATION:  GENERAL: In no apparent distress, well nourished, and hydrated.  HEAD:  Atraumatic, Normocephalic  EYES: EOMI, PERRLA, conjunctiva and sclera clear  ENMT: No tonsillar erythema, exudates, or enlargement; Moist mucous membranes, Good dentition, No lesions  NECK: Supple and normal thyroid.  No JVD or carotid bruit.  Carotid pulse is 2+ bilaterally.  HEART: regular rate; No murmurs, rubs, or gallops.  PULMONARY: Clear to auscultation and perfusion.  No rales, wheezing, or rhonchi bilaterally.  ABDOMEN: Soft, Nontender, Nondistended; Bowel sounds present  EXTREMITIES:  2+ Peripheral Pulses, No clubbing, cyanosis, or edema  LYMPH: No lymphadenopathy noted  NEUROLOGICAL: Grossly nonfocal    LABS:                        10.7   8.82  )-----------( 209      ( 2020 08:13 )             36.0     06-02    143  |  110<H>  |  35<H>  ----------------------------<  103<H>  3.9   |  30  |  2.01<H>    Ca    9.8      2020 08:13  Phos  3.0     06-01  Mg     2.5     06-02    TPro  6.1  /  Alb  3.6  /  TBili  1.0  /  DBili  x   /  AST  15  /  ALT  55  /  AlkPhos  41  06-01    PT/INR - ( 31 May 2020 20:19 )   PT: 16.6 sec;   INR: 1.48 ratio         PTT - ( 31 May 2020 20:19 )  PTT:29.7 sec  Urinalysis Basic - ( 31 May 2020 20:19 )    Color: Yellow / Appearance: Clear / S.015 / pH: x  Gluc: x / Ketone: Negative  / Bili: Negative / Urobili: Negative mg/dL   Blood: x / Protein: 30 mg/dL / Nitrite: Negative   Leuk Esterase: Negative / RBC: 6-10 /HPF / WBC Negative   Sq Epi: x / Non Sq Epi: Occasional / Bacteria: Occasional      CARDIAC MARKERS ( 2020 06:24 )  0.085 ng/mL / x     / x     / x     / x      CARDIAC MARKERS ( 2020 00:37 )  0.087 ng/mL / x     / x     / x     / x      CARDIAC MARKERS ( 31 May 2020 20:19 )  0.084 ng/mL / x     / x     / x     / x            ECHO: 2020       Fibrocalcific changes noted to the mitral valve leaflets with preserved   leaflet excursion. Mild to Moderate mitral regurgitation is present. EA   reversal of the mitral inflow consistent with reduced compliance of the   left ventricle.   Fibrocalcific changes noted to the Aortic valve leaflets with preserved   leaflet excursion.   Moderate (2+) aortic regurgitation is present.   The tricuspid valve leaflets are well seen and appear thin and pliable   with preserved leaflets excursion. Mild to Moderate Tricuspid   regurgitation is present.   Left ventricular wall motion is normal.   The left ventricleis normal in size.   Estimated left ventricular ejection fraction is 50-55 %.   The right atrium appears mildly dilated.   A device wire is seen in the RV and RA.   The right ventricle is normal in size, wall thickness, wall motion, and   contractility based on TAPSE and tissue doppler.

## 2020-06-02 NOTE — PROGRESS NOTE ADULT - ASSESSMENT
74 M with  AF s/p ablation  microscopic polyangiitis presents with acute onset chest pain and racing heart      Atrial tachycardia-  with espside torsade- will stop  sotalol andstart amio 400 BID- continue to monitor closely on tele-  daily EKG and QTC monitering      small elevation in CE are due to very rapid HR and endocardial strain-  as Trop are flat- it is unlikely to be  ACS       keep k + > 4 and mg > 2

## 2020-06-02 NOTE — PROGRESS NOTE ADULT - SUBJECTIVE AND OBJECTIVE BOX
CHIEF COMPLAINT: Patient is a 74y old  Male who presents with a chief complaint of Chest pain  Elevated troponin (31 May 2020 23:05)      HPI:  74 year old male patient with pertinent history of afib with prior ablations and cardioversions presented to the ED complaining of sudden onset of substernal chest pain associated with palpitations, sob, peripheral edema edema and significant  weight gain in the past 2-3 weeks. Patient endorses being compliant with his medications. Denies any recent fever, chills, recent travel, abdominal pain, nausea or vomiting.      In the ED patient found to be in SVT with heart rate in the 190s, Troponin of 0.084 and BNP of 5177 (31 May 2020 23:05)      6/2/2020- torsade on tele- no acute complaints from patient     PMHx: PAST MEDICAL & SURGICAL HISTORY:  MPA (microscopic polyangiitis)  Pacemaker  Atrial fibrillation            REVIEW OF SYSTEMS:    CONSTITUTIONAL: No weakness, fevers or chills  EYES/ENT: No visual changes;  No vertigo or throat pain   NECK: No pain or stiffness  RESPIRATORY: No cough, wheezing, hemoptysis; No shortness of breath  CARDIOVASCULAR: No chest pain or palpitations  GASTROINTESTINAL: No abdominal or epigastric pain. No nausea, vomiting, or hematemesis; No diarrhea or constipation. No melena or hematochezia.  GENITOURINARY: No dysuria, frequency or hematuria  NEUROLOGICAL: No numbness or weakness  SKIN: No itching, burning, rashes, or lesions   All other review of systems is negative unless indicated above      Vital Signs Last 24 Hrs  T(C): 36.5 (02 Jun 2020 05:32), Max: 36.9 (01 Jun 2020 15:27)  T(F): 97.7 (02 Jun 2020 05:32), Max: 98.5 (01 Jun 2020 15:27)  HR: 89 (02 Jun 2020 05:32) (62 - 101)  BP: 156/92 (02 Jun 2020 05:32) (126/71 - 156/92)  BP(mean): --  RR: 17 (02 Jun 2020 05:32) (17 - 18)  SpO2: 98% (02 Jun 2020 05:32) (97% - 98%)      PHYSICAL EXAM:   Constitutional: NAD, awake and alert, well-developed  HEENT: PERR, EOMI, Normal Hearing, MMM  Neck: Soft and supple, No LAD, No JVD  Respiratory: Breath sounds are clear bilaterally, No wheezing, rales or rhonchi  Cardiovascular: S1 and S2, regular rate and rhythm, no Murmurs, gallops or rubs  Gastrointestinal: Bowel Sounds present, soft, nontender, nondistended, no guarding, no rebound  Extremities: No peripheral edema  Vascular: 2+ peripheral pulses  Neurological: A/O x 3, no focal deficits  Musculoskeletal: 5/5 strength b/l upper and lower extremities  Skin: No rashes    MEDICATIONS  (STANDING):  amLODIPine   Tablet 5 milliGRAM(s) Oral daily  furosemide   Injectable 40 milliGRAM(s) IV Push daily  isosorbide   mononitrate ER Tablet (IMDUR) 30 milliGRAM(s) Oral daily  pantoprazole    Tablet 40 milliGRAM(s) Oral before breakfast  predniSONE Oral Tab/Cap - Peds 2.5 milliGRAM(s) Oral daily  rivaroxaban 15 milliGRAM(s) Oral with dinner  sotalol 80 milliGRAM(s) Oral every 12 hours    MEDICATIONS  (PRN):                              9.8    9.16  )-----------( 172      ( 01 Jun 2020 06:24 )             32.2   06-01    145  |  110<H>  |  49<H>  ----------------------------<  101<H>  3.7   |  29  |  1.95<H>    Ca    9.4      01 Jun 2020 06:24  Phos  3.0     06-01  Mg     2.6     06-01    TPro  6.1  /  Alb  3.6  /  TBili  1.0  /  DBili  x   /  AST  15  /  ALT  55  /  AlkPhos  41  06-01      Serum Pro-Brain Natriuretic Peptide: 5177 pg/mL (05-31 @ 20:19)      EKG: SVT  lateral ST changes     Telemetry: SVT -180 wiht varible block, PVCs     ECHO: < from: TTE Echo Complete w/o contrast w/ Doppler (06.01.20 @ 13:51) >   Summary     Fibrocalcific changes noted to the mitral valve leaflets with preserved   leaflet excursion. Mild to Moderate mitral regurgitation is present. EA   reversal of the mitral inflow consistent with reduced compliance of the   left ventricle.   Fibrocalcific changes noted to the Aortic valve leaflets with preserved   leaflet excursion.   Moderate (2+) aortic regurgitation is present.   The tricuspid valve leaflets are well seen and appear thin and pliable   with preserved leaflets excursion. Mild to Moderate Tricuspid   regurgitation is present.   Left ventricular wall motion is normal.   The left ventricleis normal in size.   Estimated left ventricular ejection fraction is 50-55 %.   The right atrium appears mildly dilated.   A device wire is seen in the RV and RA.   The right ventricle is normal in size, wall thickness, wall motion, and   contractility based on TAPSE and tissue doppler.     Signature     ----------------------------------------------------------------   Electronically signed by Cristhian Oro MD(Interpreting   physician) on 06/01/2020 04:16 PM   ----------------------------------------------------------------    < end of copied text >

## 2020-06-02 NOTE — CDI QUERY NOTE - NSCDIOTHERTXTBX_GEN_ALL_CORE_HH
Patient admitted with SVT    Documentation reveals : -pt with peripheral edema, weight gain and elevated BNP  -s/p lasix in the ED, noted ECHO results - diastolic CHF     BNP= 5177  Now on lasix 40mg IV qday    Please clarify the acuity of the diastolic CHF  a) Acute diastolic CHF  b) Acute on chronic diastolic CHF  c) Chronic diastolic CHF  d) Other, please clarify

## 2020-06-02 NOTE — PROGRESS NOTE ADULT - SUBJECTIVE AND OBJECTIVE BOX
Patient returns to the office today as ordered by Dr. Gilliland for a 6 weeks post op check. She is s/p left thyroid lobectomy on 04/06/2017. She is feeling well.  Her incision is healing well with no concerns. She is happy to report that her voice is back to normal as of last Saturday. TSH level from 05/24/17 was 5.036 and FREE T4 was 1.1, per Dr. Gilliland protocol the patient's Levothyroxine will be increased from 75 mcg to 88 mcg daily with a repeat TSH and FREE T4 level to be drawn in 6 weeks. Medication is sent to the patient's pharmacy, lab orders & appointment is made, our office will contact the patient by phone with test results. An appointment is also made to see Dr. Gilliland for a f/u appointment for 3 months.    CHIEF COMPLAINT/DIAGNOSIS: CP    HPI: 74 year old male patient with pertinent history of afib with prior ablations and cardioversions presented to the ED complaining of sudden onset of substernal chest pain associated with palpitations, sob, peripheral edema edema and significant  weight gain in the past 2-3 weeks. Patient endorses being compliant with his medications. Denies any recent fever, chills, recent travel, abdominal pain, nausea or vomiting. In the ED patient found to be in SVT with heart rate in the 190s, Troponin of 0.084 and BNP of 5177.    SUBJECTIVE:  6/2 - c/o diarrhea     REVIEW OF SYSTEMS:  All other review of systems is negative unless indicated above    PHYSICAL EXAM:  Constitutional: Awake and alert, well-developed  HEENT: Normal Hearing, MMM  Neck: Soft and supple, No LAD, No JVD  Respiratory: Breath sounds are clear bilaterally, No wheezing, rales or rhonchi  Cardiovascular: S1 and S2, regular rate and rhythm, no Murmurs, gallops or rubs  Gastrointestinal: Bowel Sounds present, soft, nontender, nondistended, no guarding, no rebound  Extremities: + pitting edema, +2/3 pitting edema  Vascular: 2+ peripheral pulses  Neurological: A/O x 3, no focal deficits  Musculoskeletal: 5/5 strength b/l upper and lower extremities  Skin: No rashes    Vital Signs Last 24 Hrs  T(C): 36.7 (02 Jun 2020 11:06), Max: 36.9 (01 Jun 2020 15:27)  T(F): 98.1 (02 Jun 2020 11:06), Max: 98.5 (01 Jun 2020 15:27)  HR: 96 (02 Jun 2020 11:06) (62 - 101)  BP: 135/62 (02 Jun 2020 11:06) (126/71 - 156/92)  BP(mean): --  RR: 18 (02 Jun 2020 11:06) (17 - 18)  SpO2: 98% (02 Jun 2020 11:06) (97% - 98%)    LABS: All Labs Reviewed:                        10.7   8.82  )-----------( 209      ( 02 Jun 2020 08:13 )             36.0     06-02    143  |  110<H>  |  35<H>  ----------------------------<  103<H>  3.9   |  30  |  2.01<H>    Ca    9.8      02 Jun 2020 08:13  Phos  3.0     06-01  Mg     2.5     06-02    TPro  6.1  /  Alb  3.6  /  TBili  1.0  /  DBili  x   /  AST  15  /  ALT  55  /  AlkPhos  41  06-01    PT/INR - ( 31 May 2020 20:19 )   PT: 16.6 sec;   INR: 1.48 ratio         PTT - ( 31 May 2020 20:19 )  PTT:29.7 sec  CARDIAC MARKERS ( 01 Jun 2020 06:24 )  0.085 ng/mL / x     / x     / x     / x      CARDIAC MARKERS ( 01 Jun 2020 00:37 )  0.087 ng/mL / x     / x     / x     / x      CARDIAC MARKERS ( 31 May 2020 20:19 )  0.084 ng/mL / x     / x     / x     / x          RADIOLOGY:  < from: Xray Chest 1 View- PORTABLE-Urgent (05.31.20 @ 20:50) >  IMPRESSION: Small left-sided infiltrate. Pacemaker from the right.  < end of copied text >    ECHOCARDIOGRAM:  < from: TTE Echo Complete w/o contrast w/ Doppler (06.01.20 @ 13:51) >   Impression     Summary     Fibrocalcific changes noted to the mitral valve leaflets with preserved   leaflet excursion. Mild to Moderate mitral regurgitation is present. EA   reversal of the mitral inflow consistent with reduced compliance of the   left ventricle.   Fibrocalcific changes noted to the Aortic valve leaflets with preserved   leaflet excursion.   Moderate (2+) aortic regurgitation is present.   The tricuspid valve leaflets are well seen and appear thin and pliable   with preserved leaflets excursion. Mild to Moderate Tricuspid   regurgitation is present.   Left ventricular wall motion is normal.   The left ventricleis normal in size.   Estimated left ventricular ejection fraction is 50-55 %.   The right atrium appears mildly dilated.   A device wire is seen in the RV and RA.   The right ventricle is normal in size, wall thickness, wall motion, and   contractility based on TAPSE and tissue doppler.    < end of copied text >    MEDICATIONS  (STANDING):  aMIOdarone    Tablet 400 milliGRAM(s) Oral every 12 hours  aMIOdarone    Tablet   Oral   amLODIPine   Tablet 5 milliGRAM(s) Oral daily  furosemide   Injectable 40 milliGRAM(s) IV Push daily  isosorbide   mononitrate ER Tablet (IMDUR) 30 milliGRAM(s) Oral daily  pantoprazole    Tablet 40 milliGRAM(s) Oral before breakfast  predniSONE Oral Tab/Cap - Peds 2.5 milliGRAM(s) Oral daily  rivaroxaban 15 milliGRAM(s) Oral with dinner    MEDICATIONS  (PRN):      TELEMETRY REVIEW:  6/2 - questionable torsades    ASSESSMENT AND PLAN:    Chest pain r/o ACS  h/o AFIB s/p ablation  now with SVT   SSS s/p PPM   probable CHF - type not known   CKD, at baseline   microscopic polyangiitis vasculitis    Plan:  #Chest pain   -monitor on tele  - stop cardizem infusion, converted  -EP recs sotalol  -on xarelto  Pt seen by CArd and EP     #Concern for heart failure  -pt with peripheral edema, weight gain and elevated BNP  -s/p lasix in the ED, noted ECHO results - diastolic CHF     #CKD  -pt with baseline stage 3-4 CKD  -trend kidney function    #microscopic polyangiitis vasculitis  -on prednisone  - on rituxin. next dose due in 09/2020    #DVT/Stroke ppx  -on xarelto     discussed with RN CHIEF COMPLAINT/DIAGNOSIS: CP    HPI: 74 year old male patient with pertinent history of afib with prior ablations and cardioversions presented to the ED complaining of sudden onset of substernal chest pain associated with palpitations, sob, peripheral edema edema and significant  weight gain in the past 2-3 weeks. Patient endorses being compliant with his medications. Denies any recent fever, chills, recent travel, abdominal pain, nausea or vomiting. In the ED patient found to be in SVT with heart rate in the 190s, Troponin of 0.084 and BNP of 5177.    SUBJECTIVE:  6/2 - c/o diarrhea     REVIEW OF SYSTEMS:  All other review of systems is negative unless indicated above    PHYSICAL EXAM:  Constitutional: Awake and alert, well-developed  HEENT: Normal Hearing, MMM  Neck: Soft and supple, No LAD, No JVD  Respiratory: Breath sounds are clear bilaterally, No wheezing, rales or rhonchi  Cardiovascular: S1 and S2, regular rate and rhythm, no Murmurs, gallops or rubs  Gastrointestinal: Bowel Sounds present, soft, nontender, nondistended, no guarding, no rebound  Extremities: + pitting edema, +2/3 pitting edema  Vascular: 2+ peripheral pulses  Neurological: A/O x 3, no focal deficits  Musculoskeletal: 5/5 strength b/l upper and lower extremities  Skin: No rashes    Vital Signs Last 24 Hrs  T(C): 36.7 (02 Jun 2020 11:06), Max: 36.9 (01 Jun 2020 15:27)  T(F): 98.1 (02 Jun 2020 11:06), Max: 98.5 (01 Jun 2020 15:27)  HR: 96 (02 Jun 2020 11:06) (62 - 101)  BP: 135/62 (02 Jun 2020 11:06) (126/71 - 156/92)  BP(mean): --  RR: 18 (02 Jun 2020 11:06) (17 - 18)  SpO2: 98% (02 Jun 2020 11:06) (97% - 98%)    LABS: All Labs Reviewed:                        10.7   8.82  )-----------( 209      ( 02 Jun 2020 08:13 )             36.0     06-02    143  |  110<H>  |  35<H>  ----------------------------<  103<H>  3.9   |  30  |  2.01<H>    Ca    9.8      02 Jun 2020 08:13  Phos  3.0     06-01  Mg     2.5     06-02    TPro  6.1  /  Alb  3.6  /  TBili  1.0  /  DBili  x   /  AST  15  /  ALT  55  /  AlkPhos  41  06-01    PT/INR - ( 31 May 2020 20:19 )   PT: 16.6 sec;   INR: 1.48 ratio         PTT - ( 31 May 2020 20:19 )  PTT:29.7 sec  CARDIAC MARKERS ( 01 Jun 2020 06:24 )  0.085 ng/mL / x     / x     / x     / x      CARDIAC MARKERS ( 01 Jun 2020 00:37 )  0.087 ng/mL / x     / x     / x     / x      CARDIAC MARKERS ( 31 May 2020 20:19 )  0.084 ng/mL / x     / x     / x     / x          RADIOLOGY:  < from: Xray Chest 1 View- PORTABLE-Urgent (05.31.20 @ 20:50) >  IMPRESSION: Small left-sided infiltrate. Pacemaker from the right.  < end of copied text >    ECHOCARDIOGRAM:  < from: TTE Echo Complete w/o contrast w/ Doppler (06.01.20 @ 13:51) >   Impression     Summary     Fibrocalcific changes noted to the mitral valve leaflets with preserved   leaflet excursion. Mild to Moderate mitral regurgitation is present. EA   reversal of the mitral inflow consistent with reduced compliance of the   left ventricle.   Fibrocalcific changes noted to the Aortic valve leaflets with preserved   leaflet excursion.   Moderate (2+) aortic regurgitation is present.   The tricuspid valve leaflets are well seen and appear thin and pliable   with preserved leaflets excursion. Mild to Moderate Tricuspid   regurgitation is present.   Left ventricular wall motion is normal.   The left ventricleis normal in size.   Estimated left ventricular ejection fraction is 50-55 %.   The right atrium appears mildly dilated.   A device wire is seen in the RV and RA.   The right ventricle is normal in size, wall thickness, wall motion, and   contractility based on TAPSE and tissue doppler.    < end of copied text >    MEDICATIONS  (STANDING):  aMIOdarone    Tablet 400 milliGRAM(s) Oral every 12 hours  aMIOdarone    Tablet   Oral   amLODIPine   Tablet 5 milliGRAM(s) Oral daily  furosemide   Injectable 40 milliGRAM(s) IV Push daily  isosorbide   mononitrate ER Tablet (IMDUR) 30 milliGRAM(s) Oral daily  pantoprazole    Tablet 40 milliGRAM(s) Oral before breakfast  predniSONE Oral Tab/Cap - Peds 2.5 milliGRAM(s) Oral daily  rivaroxaban 15 milliGRAM(s) Oral with dinner    MEDICATIONS  (PRN):      TELEMETRY REVIEW:  6/2 - questionable torsades    ASSESSMENT AND PLAN:    1)  Chest pain r/o ACS  - trops mildly elevated x3 / BNP 5177  - ECG w/ ST abnormality   - likely demand in the setting of rapid HR and endocardial strain  - CXR w/ questionable small left-sided infiltrate, no leucocytosis, cough, fever -- observe off abx fo now   - cardio f/u appreciated    2)  h/o AFIB, SSS s/p ablation/PPM now with SVT and questionable torsades   - cont. tele monitoring. tele review as above.  - s/p IV cards gtt, PO sotalol   - switch sotalol to amiodarone, serial ECG's   - PPM interrogation pending >  - Cont. Xarelto for stroke PPX  - Cardio f/u appreciated, EP Cx    3)  Acute on Chronic diastolic HF exacerbation  - Daily weight / low sodium diet / FR 1500mL  - Cont. IV Lasix QD  - Echo as above. EF 50-55%   - Pt. with peripheral edema, weight gain noted  - Cardio f/u appreciated    4)  Diarrhea  - pt. on rituxin, ppx bactrim and prednisone  - maintain isolation, r/o cdiff, GI PCR     5)  Microscopic polyangiitis vasculitis  - Cont. prednisone  - On Rituxan, next dose due in 09/2020  - according to patient, on PPX bactrim outpatient     6)  CKD Stage 3-4 -- stable   - cont. to trend renal function on diuretics     7) DVT PPX  - Cont. Xarelto CHIEF COMPLAINT/DIAGNOSIS: CP    HPI: 74 year old male patient with pertinent history of afib with prior ablations and cardioversions presented to the ED complaining of sudden onset of substernal chest pain associated with palpitations, sob, peripheral edema edema and significant  weight gain in the past 2-3 weeks. Patient endorses being compliant with his medications. Denies any recent fever, chills, recent travel, abdominal pain, nausea or vomiting. In the ED patient found to be in SVT with heart rate in the 190s, Troponin of 0.084 and BNP of 5177.    SUBJECTIVE:  6/2 - c/o diarrhea     REVIEW OF SYSTEMS:  All other review of systems is negative unless indicated above    PHYSICAL EXAM:  Constitutional: Awake and alert, well-developed  HEENT: Normal Hearing, MMM  Neck: Soft and supple, No LAD, No JVD  Respiratory: Breath sounds are clear bilaterally, No wheezing, rales or rhonchi  Cardiovascular: S1 and S2, regular rate and rhythm, no Murmurs, gallops or rubs  Gastrointestinal: Bowel Sounds present, soft, nontender, nondistended, no guarding, no rebound  Extremities: + pitting edema, +2/3 pitting edema  Vascular: 2+ peripheral pulses  Neurological: A/O x 3, no focal deficits  Musculoskeletal: 5/5 strength b/l upper and lower extremities  Skin: No rashes, no jaundice    Vital Signs Last 24 Hrs  T(C): 36.7 (02 Jun 2020 11:06), Max: 36.9 (01 Jun 2020 15:27)  T(F): 98.1 (02 Jun 2020 11:06), Max: 98.5 (01 Jun 2020 15:27)  HR: 96 (02 Jun 2020 11:06) (62 - 101)  BP: 135/62 (02 Jun 2020 11:06) (126/71 - 156/92)  BP(mean): --  RR: 18 (02 Jun 2020 11:06) (17 - 18)  SpO2: 98% (02 Jun 2020 11:06) (97% - 98%)    LABS: All Labs Reviewed:                        10.7   8.82  )-----------( 209      ( 02 Jun 2020 08:13 )             36.0     06-02    143  |  110<H>  |  35<H>  ----------------------------<  103<H>  3.9   |  30  |  2.01<H>    Ca    9.8      02 Jun 2020 08:13  Phos  3.0     06-01  Mg     2.5     06-02    TPro  6.1  /  Alb  3.6  /  TBili  1.0  /  DBili  x   /  AST  15  /  ALT  55  /  AlkPhos  41  06-01    PT/INR - ( 31 May 2020 20:19 )   PT: 16.6 sec;   INR: 1.48 ratio         PTT - ( 31 May 2020 20:19 )  PTT:29.7 sec  CARDIAC MARKERS ( 01 Jun 2020 06:24 )  0.085 ng/mL / x     / x     / x     / x      CARDIAC MARKERS ( 01 Jun 2020 00:37 )  0.087 ng/mL / x     / x     / x     / x      CARDIAC MARKERS ( 31 May 2020 20:19 )  0.084 ng/mL / x     / x     / x     / x          RADIOLOGY:  < from: Xray Chest 1 View- PORTABLE-Urgent (05.31.20 @ 20:50) >  IMPRESSION: Small left-sided infiltrate. Pacemaker from the right.  < end of copied text >    ECHOCARDIOGRAM:  < from: TTE Echo Complete w/o contrast w/ Doppler (06.01.20 @ 13:51) >   Impression     Summary     Fibrocalcific changes noted to the mitral valve leaflets with preserved   leaflet excursion. Mild to Moderate mitral regurgitation is present. EA   reversal of the mitral inflow consistent with reduced compliance of the   left ventricle.   Fibrocalcific changes noted to the Aortic valve leaflets with preserved   leaflet excursion.   Moderate (2+) aortic regurgitation is present.   The tricuspid valve leaflets are well seen and appear thin and pliable   with preserved leaflets excursion. Mild to Moderate Tricuspid   regurgitation is present.   Left ventricular wall motion is normal.   The left ventricleis normal in size.   Estimated left ventricular ejection fraction is 50-55 %.   The right atrium appears mildly dilated.   A device wire is seen in the RV and RA.   The right ventricle is normal in size, wall thickness, wall motion, and   contractility based on TAPSE and tissue doppler.    < end of copied text >    MEDICATIONS  (STANDING):  aMIOdarone    Tablet 400 milliGRAM(s) Oral every 12 hours  aMIOdarone    Tablet   Oral   amLODIPine   Tablet 5 milliGRAM(s) Oral daily  furosemide   Injectable 40 milliGRAM(s) IV Push daily  isosorbide   mononitrate ER Tablet (IMDUR) 30 milliGRAM(s) Oral daily  pantoprazole    Tablet 40 milliGRAM(s) Oral before breakfast  predniSONE Oral Tab/Cap - Peds 2.5 milliGRAM(s) Oral daily  rivaroxaban 15 milliGRAM(s) Oral with dinner    MEDICATIONS  (PRN):      TELEMETRY REVIEW:  6/2 - questionable torsades    ASSESSMENT AND PLAN:    1)  Chest pain r/o ACS  - trops mildly elevated x3 / BNP 5177  - ECG w/ ST abnormality   - likely demand in the setting of rapid HR and endocardial strain  - CXR w/ questionable small left-sided infiltrate, no leucocytosis, cough, fever -- observe off abx fo now   - cardio f/u appreciated    2)  h/o AFIB, SSS s/p ablation/PPM now with SVT and questionable torsades   - cont. tele monitoring. tele review as above.  - s/p IV cards gtt, PO sotalol   - switch sotalol to amiodarone, serial ECG's   - PPM interrogation pending >  - Cont. Xarelto for stroke PPX  - Cardio f/u appreciated, EP Cx    3)  Acute on Chronic diastolic HF exacerbation  - Daily weight / low sodium diet / FR 1500mL  - Cont. IV Lasix QD  - Echo as above. EF 50-55%   - Pt. with peripheral edema, weight gain noted  - Cardio f/u appreciated    4)  Diarrhea  - pt. on rituxin, ppx bactrim and prednisone  - maintain isolation, r/o cdiff, GI PCR     5)  Microscopic polyangiitis vasculitis  - Cont. prednisone  - On Rituxan, next dose due in 09/2020  - according to patient, on PPX bactrim outpatient     6)  CKD Stage 3-4 -- stable   - cont. to trend renal function on diuretics     7) DVT PPX  - Cont. Xarelto

## 2020-06-02 NOTE — CONSULT NOTE ADULT - ASSESSMENT
73 yo male with HTN, PAF s/p several ablations in the past, presented with chest pain and palpitations.   found to be in SVT with rates 180-190bpm treated with cardizem and rates improved after single dose amiodarone.     EKG this morning shows atypical atrial flutter with controlled ventricular  rate, symptoms improved.
74 M with  AF s/p ablation  microscopic polyangiitis presents with acute onset chest pain and racing heart      SVT- on Cardizem drip without significant improvement in heart rate-  did not respond to adenosine regarding cardioversion to SR, there is one short segment in rhythm strip which suggests AT as underlying rhythm     small elevation in CE are due to very rapid HR and endocardial strain-  as Trop are flat- it is unlikely to be  ACS     will give amiodarone 150 to try to slow/ convert rhythm        recommend echocardiogram to evalaute LV and valvular function     keep k + > 4 and mg > 2     tele monitering    EP consulted
73 yo male with HTN, PAF s/p several ablations in the past, presented with chest pain and palpitations.   found to be in SVT with rates 180-190bpm treated with cardizem and rates improved after single dose amiodarone.       Pt is known to my colleague, Dr. Keshia Jha for anemia and history of   ANCA positive vascultis, renal failure, anemia, on rituxan and prednisone.    Has also been on Aranesp q3 weeks since 4/15/2020, last dose given on 5/27/2020.  Baseline Hgb 9.9 on 5/27/2020 in our office  Baseline Cr 1.89    #heme  -no need for transfusion  -due to f/u wtih Dr. Jha in our [place] office on June 17 at 12 pm for next aranesp  -monitor CBC    #rheum  -c/w prednisone  -next rituxan due in 9/2020    #cv  -c/w amiodarone as per cardiology.    Blair Bowden MD  Hematology/Oncology  Cell:  [phone]  Office Phone: [phone]  Office Fax:  [phone]  [facility] [place] [id number]

## 2020-06-02 NOTE — PROGRESS NOTE ADULT - ASSESSMENT
73 yo male with HTN, PAF s/p several ablations in the past, presented with chest pain and palpitations.   found to be in SVT with rates 180-190bpm treated with cardizem and rates improved after single dose amiodarone.   EKG this morning shows atypical atrial flutter with controlled ventricular  rate, symptoms improved.     sotalol discontinued due to polymorphic WCT on telemetry, and QTc >500ms  started on amiodarone oral loading by Dr Thomson.

## 2020-06-03 ENCOUNTER — TRANSCRIPTION ENCOUNTER (OUTPATIENT)
Age: 75
End: 2020-06-03

## 2020-06-03 LAB
ANION GAP SERPL CALC-SCNC: 4 MMOL/L — LOW (ref 5–17)
BUN SERPL-MCNC: 33 MG/DL — HIGH (ref 7–23)
CALCIUM SERPL-MCNC: 9.5 MG/DL — SIGNIFICANT CHANGE UP (ref 8.5–10.1)
CHLORIDE SERPL-SCNC: 110 MMOL/L — HIGH (ref 96–108)
CO2 SERPL-SCNC: 29 MMOL/L — SIGNIFICANT CHANGE UP (ref 22–31)
CREAT SERPL-MCNC: 1.98 MG/DL — HIGH (ref 0.5–1.3)
GLUCOSE SERPL-MCNC: 120 MG/DL — HIGH (ref 70–99)
MAGNESIUM SERPL-MCNC: 2.5 MG/DL — SIGNIFICANT CHANGE UP (ref 1.6–2.6)
POTASSIUM SERPL-MCNC: 3.7 MMOL/L — SIGNIFICANT CHANGE UP (ref 3.5–5.3)
POTASSIUM SERPL-SCNC: 3.7 MMOL/L — SIGNIFICANT CHANGE UP (ref 3.5–5.3)
SODIUM SERPL-SCNC: 143 MMOL/L — SIGNIFICANT CHANGE UP (ref 135–145)

## 2020-06-03 PROCEDURE — 93010 ELECTROCARDIOGRAM REPORT: CPT

## 2020-06-03 PROCEDURE — 99232 SBSQ HOSP IP/OBS MODERATE 35: CPT

## 2020-06-03 RX ADMIN — RIVAROXABAN 15 MILLIGRAM(S): KIT at 17:52

## 2020-06-03 RX ADMIN — AMIODARONE HYDROCHLORIDE 400 MILLIGRAM(S): 400 TABLET ORAL at 21:03

## 2020-06-03 RX ADMIN — PANTOPRAZOLE SODIUM 40 MILLIGRAM(S): 20 TABLET, DELAYED RELEASE ORAL at 10:29

## 2020-06-03 RX ADMIN — AMLODIPINE BESYLATE 5 MILLIGRAM(S): 2.5 TABLET ORAL at 10:29

## 2020-06-03 RX ADMIN — AMIODARONE HYDROCHLORIDE 400 MILLIGRAM(S): 400 TABLET ORAL at 10:29

## 2020-06-03 RX ADMIN — ISOSORBIDE MONONITRATE 30 MILLIGRAM(S): 60 TABLET, EXTENDED RELEASE ORAL at 10:29

## 2020-06-03 RX ADMIN — Medication 2.5 MILLIGRAM(S): at 10:29

## 2020-06-03 RX ADMIN — Medication 40 MILLIGRAM(S): at 10:29

## 2020-06-03 NOTE — PROGRESS NOTE ADULT - SUBJECTIVE AND OBJECTIVE BOX
CHIEF COMPLAINT/DIAGNOSIS: CP    HPI: 74 year old male patient with pertinent history of afib with prior ablations and cardioversions presented to the ED complaining of sudden onset of substernal chest pain associated with palpitations, sob, peripheral edema edema and significant  weight gain in the past 2-3 weeks. Patient endorses being compliant with his medications. Denies any recent fever, chills, recent travel, abdominal pain, nausea or vomiting. In the ED patient found to be in SVT with heart rate in the 190s, Troponin of 0.084 and BNP of 5177.    Overnight Events 6/3: RRT called for Vtach for 2 minutes on tele, patient was in the bathroom during episode, which lasted a total of ~ 8 minutes after which patient converted to normal sinus rhythm with rate in 60s. Patient asymptomatic.    SUBJECTIVE:  6/2 - c/o of diarrhea   6/3 - no c/o, sob improved. no diarrhea x24 hours     REVIEW OF SYSTEMS:  All other review of systems is negative unless indicated above    PHYSICAL EXAM:  Constitutional: Awake and alert, well-developed  HEENT: Normal Hearing, MMM  Neck: Soft and supple, No LAD, No JVD  Respiratory: Breath sounds are clear bilaterally, No wheezing, rales or rhonchi  Cardiovascular: S1 and S2, regular rate and rhythm, no Murmurs, gallops or rubs  Gastrointestinal: Bowel Sounds present, soft, nontender, nondistended, no guarding, no rebound  Extremities: + pitting edema, +2/3 pitting edema  Vascular: 2+ peripheral pulses  Neurological: A/O x 3, no focal deficits  Musculoskeletal: 5/5 strength b/l upper and lower extremities  Skin: No rashes, no jaundice      Vital Signs Last 24 Hrs  T(C): 36.5 (03 Jun 2020 08:03), Max: 36.8 (02 Jun 2020 16:05)  T(F): 97.7 (03 Jun 2020 08:03), Max: 98.3 (02 Jun 2020 16:05)  HR: 84 (03 Jun 2020 08:03) (68 - 189)  BP: 106/50 (03 Jun 2020 08:03) (106/50 - 166/82)  BP(mean): --  RR: 19 (03 Jun 2020 08:03) (18 - 20)  SpO2: 96% (03 Jun 2020 08:03) (96% - 100%)      LABS: All Labs Reviewed:    06-03    143  |  110<H>  |  33<H>  ----------------------------<  120<H>  3.7   |  29  |  1.98<H>    Ca    9.5      03 Jun 2020 08:41  Mg     2.5     06-03      RADIOLOGY:  < from: Xray Chest 1 View- PORTABLE-Urgent (05.31.20 @ 20:50) >  IMPRESSION: Small left-sided infiltrate. Pacemaker from the right.  < end of copied text >    ECHOCARDIOGRAM:  < from: TTE Echo Complete w/o contrast w/ Doppler (06.01.20 @ 13:51) >   Impression     Summary     Fibrocalcific changes noted to the mitral valve leaflets with preserved   leaflet excursion. Mild to Moderate mitral regurgitation is present. EA   reversal of the mitral inflow consistent with reduced compliance of the   left ventricle.   Fibrocalcific changes noted to the Aortic valve leaflets with preserved   leaflet excursion.   Moderate (2+) aortic regurgitation is present.   The tricuspid valve leaflets are well seen and appear thin and pliable   with preserved leaflets excursion. Mild to Moderate Tricuspid   regurgitation is present.   Left ventricular wall motion is normal.   The left ventricleis normal in size.   Estimated left ventricular ejection fraction is 50-55 %.   The right atrium appears mildly dilated.   A device wire is seen in the RV and RA.   The right ventricle is normal in size, wall thickness, wall motion, and   contractility based on TAPSE and tissue doppler.    < end of copied text >      MEDICATIONS  (STANDING):  aMIOdarone    Tablet 400 milliGRAM(s) Oral every 12 hours  aMIOdarone    Tablet   Oral   amLODIPine   Tablet 5 milliGRAM(s) Oral daily  furosemide   Injectable 40 milliGRAM(s) IV Push daily  isosorbide   mononitrate ER Tablet (IMDUR) 30 milliGRAM(s) Oral daily  pantoprazole    Tablet 40 milliGRAM(s) Oral before breakfast  predniSONE Oral Tab/Cap - Peds 2.5 milliGRAM(s) Oral daily  rivaroxaban 15 milliGRAM(s) Oral with dinner    MEDICATIONS  (PRN):        TELEMETRY REVIEW:  6/2 - questionable torsades  6/3 - NSVT, triplets      ASSESSMENT AND PLAN:      1)  Chest pain r/o ACS  - trops mildly elevated x3 / BNP 5177  - ECG w/ ST abnormality   - likely demand in the setting of rapid HR and endocardial strain  - CXR w/ questionable small left-sided infiltrate, no leucocytosis, cough, fever -- observe off abx fo now   - cardio f/u appreciated    2)  h/o AFIB, SSS s/p ablation/PPM now with atrial tachycardia and torsades   - cont. tele monitoring. tele review as above.  - s/p IV cards gtt, PO sotalol   - switched sotalol to amiodarone, serial ECG's - cont. 400mg BID  - Cont. Xarelto for stroke PPX  - Cardio, EP Cx  f/u appreciated  6/3 - RRT this AM. Cont. tele.    3)  Acute on Chronic diastolic HF exacerbation  - Daily weight / low sodium diet / FR 1500mL  - Cont. IV Lasix QD  - Echo as above. EF 50-55%   - Cardio f/u appreciated  6/3 - weight loss noted.    4)  Diarrhea  - pt. on rituxin, ppx bactrim and prednisone  - d/c isolation, d/c r/o cdiff, GI PCR ~ no episodes of diarrhea x24 hours    5)  Microscopic polyangiitis vasculitis  - Cont. prednisone  - On Rituxan, next dose due in 09/2020  - according to patient, on PPX bactrim outpatient     6)  CKD Stage 3-4 -- stable   - cont. to trend renal function on diuretics     7) DVT PPX  - Cont. Xarelto CHIEF COMPLAINT/DIAGNOSIS: CP    HPI: 74 year old male patient with pertinent history of afib with prior ablations and cardioversions presented to the ED complaining of sudden onset of substernal chest pain associated with palpitations, sob, peripheral edema edema and significant  weight gain in the past 2-3 weeks. Patient endorses being compliant with his medications. Denies any recent fever, chills, recent travel, abdominal pain, nausea or vomiting. In the ED patient found to be in SVT with heart rate in the 190s, Troponin of 0.084 and BNP of 5177.    Overnight Events 6/3: RRT called for Vtach for 2 minutes on tele, patient was in the bathroom during episode, which lasted a total of ~ 8 minutes after which patient converted to normal sinus rhythm with rate in 60s. Patient asymptomatic.    SUBJECTIVE:  6/2 - c/o of diarrhea   6/3 - no c/o, sob improved. no diarrhea x24 hours     REVIEW OF SYSTEMS:  All other review of systems is negative unless indicated above    PHYSICAL EXAM:  Constitutional: Awake and alert, well-developed  HEENT: Normal Hearing, MMM  Neck: Soft and supple, No LAD, No JVD  Respiratory: Breath sounds are clear bilaterally, No wheezing, rales or rhonchi  Cardiovascular: S1 and S2, regular rate and rhythm, no Murmurs, gallops or rubs  Gastrointestinal: Bowel Sounds present, soft, nontender, nondistended, no guarding, no rebound  Extremities: + pitting edema, +2/3 pitting edema  Vascular: 2+ peripheral pulses  Neurological: A/O x 3, no focal deficits  Musculoskeletal: 5/5 strength b/l upper and lower extremities  Skin: No rashes, no jaundice      Vital Signs Last 24 Hrs  T(C): 36.5 (03 Jun 2020 08:03), Max: 36.8 (02 Jun 2020 16:05)  T(F): 97.7 (03 Jun 2020 08:03), Max: 98.3 (02 Jun 2020 16:05)  HR: 84 (03 Jun 2020 08:03) (68 - 189)  BP: 106/50 (03 Jun 2020 08:03) (106/50 - 166/82)  BP(mean): --  RR: 19 (03 Jun 2020 08:03) (18 - 20)  SpO2: 96% (03 Jun 2020 08:03) (96% - 100%)      LABS: All Labs Reviewed:    06-03    143  |  110<H>  |  33<H>  ----------------------------<  120<H>  3.7   |  29  |  1.98<H>    Ca    9.5      03 Jun 2020 08:41  Mg     2.5     06-03      RADIOLOGY:  < from: Xray Chest 1 View- PORTABLE-Urgent (05.31.20 @ 20:50) >  IMPRESSION: Small left-sided infiltrate. Pacemaker from the right.  < end of copied text >    ECHOCARDIOGRAM:  < from: TTE Echo Complete w/o contrast w/ Doppler (06.01.20 @ 13:51) >   Impression     Summary     Fibrocalcific changes noted to the mitral valve leaflets with preserved   leaflet excursion. Mild to Moderate mitral regurgitation is present. EA   reversal of the mitral inflow consistent with reduced compliance of the   left ventricle.   Fibrocalcific changes noted to the Aortic valve leaflets with preserved   leaflet excursion.   Moderate (2+) aortic regurgitation is present.   The tricuspid valve leaflets are well seen and appear thin and pliable   with preserved leaflets excursion. Mild to Moderate Tricuspid   regurgitation is present.   Left ventricular wall motion is normal.   The left ventricleis normal in size.   Estimated left ventricular ejection fraction is 50-55 %.   The right atrium appears mildly dilated.   A device wire is seen in the RV and RA.   The right ventricle is normal in size, wall thickness, wall motion, and   contractility based on TAPSE and tissue doppler.    < end of copied text >      MEDICATIONS  (STANDING):  aMIOdarone    Tablet 400 milliGRAM(s) Oral every 12 hours  aMIOdarone    Tablet   Oral   amLODIPine   Tablet 5 milliGRAM(s) Oral daily  furosemide   Injectable 40 milliGRAM(s) IV Push daily  isosorbide   mononitrate ER Tablet (IMDUR) 30 milliGRAM(s) Oral daily  pantoprazole    Tablet 40 milliGRAM(s) Oral before breakfast  predniSONE Oral Tab/Cap - Peds 2.5 milliGRAM(s) Oral daily  rivaroxaban 15 milliGRAM(s) Oral with dinner    MEDICATIONS  (PRN):        TELEMETRY REVIEW:  6/2 - questionable torsades  6/3 - NSVT, triplets      ASSESSMENT AND PLAN:      1)  Chest pain r/o ACS  - trops mildly elevated x3 / BNP 5177  - ECG w/ ST abnormality   - likely demand in the setting of rapid HR and endocardial strain  - CXR w/ questionable small left-sided infiltrate, no leucocytosis, cough, fever -- observe off abx fo now   - cardio f/u appreciated    2)  h/o AFIB, SSS s/p ablation/PPM now with atrial tachycardia and torsades   - cont. tele monitoring. tele review as above.  - s/p IV cards gtt, PO sotalol   - switched sotalol to amiodarone, serial ECG's - cont. 400mg BID  - Cont. Xarelto for stroke PPX  - Cardio, EP Cx  f/u appreciated  6/3 - RRT this AM. Cont. tele.    3)  Acute on Chronic diastolic HF exacerbation  - Daily weight / low sodium diet / FR 1500mL  - Cont. IV Lasix QD  - Echo as above. EF 50-55%   - Cardio f/u appreciated  6/3 - weight loss noted.    4)  Diarrhea  - pt. on rituxin, ppx bactrim and prednisone  - d/c isolation, d/c r/o cdiff, GI PCR ~ no episodes of diarrhea x24 hours    5)  Microscopic polyangiitis vasculitis  - Cont. prednisone  - On Rituxan, next dose due in 09/2020  - according to patient, on PPX bactrim outpatient     6)  CKD Stage 3-4 -- stable   - cont. to trend renal function on diuretics     7) DVT PPX  - Cont. Xarelto       Dispo: inpatient. possible d/c later this week

## 2020-06-03 NOTE — DISCHARGE NOTE NURSING/CASE MANAGEMENT/SOCIAL WORK - NURSING SECTION COMPLETE
After Visit Summary   2017    Aric Santana    MRN: [id number]           Patient Information     Date Of Birth          1964        Visit Information        Provider Department      2017 2:20 PM Charles Mcdowell OD [facility]        Today's Diagnoses     Meibomian gland dysfunction    -  1    Hyperopia, bilateral        Allergic conjunctivitis, bilateral           Follow-ups after your visit        Your next 10 appointments already scheduled     May 30, 2017  2:00 PM CDT   (Arrive by 1:45 PM)   NEW PLASTICS with Ashley Abdul MD   [facility] ([facility])    [address] [place] [address]  4th Floor  [facility] [address] [phone]              Who to contact     Please call your clinic at [phone] to:    Ask questions about your health    Make or cancel appointments    Discuss your medicines    Learn about your test results    Speak to your doctor   If you have compliments or concerns about an experience at your clinic, or if you wish to file a complaint, please contact [facility] Patient Relations at [phone] or email us at [email or web address]         Additional Information About Your Visit        MyChart Information     [employer] is an electronic gateway that provides easy, online access to your medical records. With [employer], you can request a clinic appointment, read your test results, renew a prescription or communicate with your care team.     To sign up for [employer] visit the website at [email or web address]   You will be asked to enter the access code listed below, as well as some personal information. Please follow the directions to create your username and password.     Your access code is: [email or web address]  Expires: 2017  2:05 PM     Your access code will  in 90 days. If you need help or a new code, please contact your [facility] or call [phone] for assistance.        Care EveryWhere ID     This is your Care EveryWhere ID. This could be used by other organizations to access your [place] medical records  [id number]         Blood Pressure from Last 3 Encounters:   No data found for BP    Weight from Last 3 Encounters:   No data found for Wt              We Performed the Following     Refraction        Primary Care Provider    None Specified       No primary provider on file.        Thank you!     Thank you for choosing [facility]  for your care. Our goal is always to provide you with excellent care. Hearing back from our patients is one way we can continue to improve our services. Please take a few minutes to complete the written survey that you may receive in the mail after your visit with us. Thank you!             Your Updated Medication List - Protect others around you: Learn how to safely use, store and throw away your medicines at www.disposemymeds.org.          This list is accurate as of: 5/22/17  3:21 PM.  Always use your most recent med list.                   Brand Name Dispense Instructions for use    olopatadine 0.1 % ophthalmic solution    PATANOL    1 Bottle    Place 1 drop into both eyes 2 times daily          Patient/Caregiver provided printed discharge information.

## 2020-06-03 NOTE — PROGRESS NOTE ADULT - ATTENDING COMMENTS
I have personally seen and examined patient today.   I discussed the case with APN and I agree with findings and plan as detailed per note above, which I have amended where appropriate.
I have personally seen and examined patient today.   I discussed the case with APN and I agree with findings and plan as detailed per note above, which I have amended where appropriate.  Patient was evaluated during COVID pandemic.

## 2020-06-03 NOTE — PROGRESS NOTE ADULT - SUBJECTIVE AND OBJECTIVE BOX
CHIEF COMPLAINT: Patient is a 74y old  Male who presents with a chief complaint of Chest pain  Elevated troponin (31 May 2020 23:05)      HPI:  74 year old male patient with pertinent history of afib with prior ablations and cardioversions presented to the ED complaining of sudden onset of substernal chest pain associated with palpitations, sob, peripheral edema edema and significant  weight gain in the past 2-3 weeks. Patient endorses being compliant with his medications. Denies any recent fever, chills, recent travel, abdominal pain, nausea or vomiting.      In the ED patient found to be in SVT with heart rate in the 190s, Troponin of 0.084 and BNP of 5177 (31 May 2020 23:05)      6/2/2020- torsade on tele- no acute complaints from patient   6/3/2020-- tachycardia overnight with appearance of VT but same speed as his SVT/ Atach    PMHx: PAST MEDICAL & SURGICAL HISTORY:  MPA (microscopic polyangiitis)  Pacemaker  Atrial fibrillation            REVIEW OF SYSTEMS:    CONSTITUTIONAL: No weakness, fevers or chills  EYES/ENT: No visual changes;  No vertigo or throat pain   NECK: No pain or stiffness  RESPIRATORY: No cough, wheezing, hemoptysis; No shortness of breath  CARDIOVASCULAR: No chest pain or palpitations  GASTROINTESTINAL: No abdominal or epigastric pain. No nausea, vomiting, or hematemesis; No diarrhea or constipation. No melena or hematochezia.  GENITOURINARY: No dysuria, frequency or hematuria  NEUROLOGICAL: No numbness or weakness  SKIN: No itching, burning, rashes, or lesions   All other review of systems is negative unless indicated above      ICU Vital Signs Last 24 Hrs  T(C): 36.4 (03 Jun 2020 04:00), Max: 36.8 (02 Jun 2020 16:05)  T(F): 97.6 (03 Jun 2020 04:00), Max: 98.3 (02 Jun 2020 16:05)  HR: 84 (03 Jun 2020 05:31) (68 - 189)  BP: 157/76 (03 Jun 2020 05:31) (127/72 - 166/82)  BP(mean): --  ABP: --  ABP(mean): --  RR: 20 (03 Jun 2020 05:31) (18 - 20)  SpO2: 100% (03 Jun 2020 05:31) (96% - 100%)        PHYSICAL EXAM:   Constitutional: NAD, awake and alert, well-developed  HEENT: PERR, EOMI, Normal Hearing, MMM  Neck: Soft and supple, No LAD, No JVD  Respiratory: Breath sounds are clear bilaterally, No wheezing, rales or rhonchi  Cardiovascular: S1 and S2, regular rate and rhythm, no Murmurs, gallops or rubs  Gastrointestinal: Bowel Sounds present, soft, nontender, nondistended, no guarding, no rebound  Extremities: No peripheral edema  Vascular: 2+ peripheral pulses  Neurological: A/O x 3, no focal deficits  Musculoskeletal: 5/5 strength b/l upper and lower extremities  Skin: No rashes      MEDICATIONS  (STANDING):  aMIOdarone    Tablet 400 milliGRAM(s) Oral every 12 hours  aMIOdarone    Tablet   Oral   amLODIPine   Tablet 5 milliGRAM(s) Oral daily  furosemide   Injectable 40 milliGRAM(s) IV Push daily  isosorbide   mononitrate ER Tablet (IMDUR) 30 milliGRAM(s) Oral daily  pantoprazole    Tablet 40 milliGRAM(s) Oral before breakfast  predniSONE Oral Tab/Cap - Peds 2.5 milliGRAM(s) Oral daily  rivaroxaban 15 milliGRAM(s) Oral with dinner    MEDICATIONS  (PRN):                                    10.7   8.82  )-----------( 209      ( 02 Jun 2020 08:13 )             36.0   06-02    143  |  110<H>  |  35<H>  ----------------------------<  103<H>  3.9   |  30  |  2.01<H>    Ca    9.8      02 Jun 2020 08:13  Mg     2.5     06-02      Serum Pro-Brain Natriuretic Peptide: 5177 pg/mL (05-31 @ 20:19)      EKG: SVT  lateral ST changes     Telemetry: SVT -180 wiht varible block, PVCs     ECHO: < from: TTE Echo Complete w/o contrast w/ Doppler (06.01.20 @ 13:51) >   Summary     Fibrocalcific changes noted to the mitral valve leaflets with preserved   leaflet excursion. Mild to Moderate mitral regurgitation is present. EA   reversal of the mitral inflow consistent with reduced compliance of the   left ventricle.   Fibrocalcific changes noted to the Aortic valve leaflets with preserved   leaflet excursion.   Moderate (2+) aortic regurgitation is present.   The tricuspid valve leaflets are well seen and appear thin and pliable   with preserved leaflets excursion. Mild to Moderate Tricuspid   regurgitation is present.   Left ventricular wall motion is normal.   The left ventricleis normal in size.   Estimated left ventricular ejection fraction is 50-55 %.   The right atrium appears mildly dilated.   A device wire is seen in the RV and RA.   The right ventricle is normal in size, wall thickness, wall motion, and   contractility based on TAPSE and tissue doppler.     Signature     ----------------------------------------------------------------   Electronically signed by Cristhian Oro MD(Interpreting   physician) on 06/01/2020 04:16 PM   ----------------------------------------------------------------    < end of copied text >

## 2020-06-03 NOTE — PROGRESS NOTE ADULT - ASSESSMENT
74 M with  AF s/p ablation  microscopic polyangiitis presents with acute onset chest pain and racing heart      Atrial tachycardia-  with espside torsade- stopped  sotalol and started  amio  400 BID- continue to monitor closely on tele-  daily EKG and QTC monitering -  episode of tachcardia early this AM priti  SVT with abberency- discussed with EP who is following      small elevation in CE are due to very rapid HR and endocardial strain-  as Trop are flat- it is unlikely to be  ACS       keep k + > 4 and mg > 2       continue tele montering

## 2020-06-03 NOTE — PROGRESS NOTE ADULT - ASSESSMENT
75 yo male with HTN, PAF s/p several ablations in the past, presented with chest pain and palpitations.   found to be in SVT with rates 180-190bpm treated with cardizem and rates improved after single dose amiodarone.   EKG this morning shows atypical atrial flutter with controlled ventricular  rate, symptoms improved.     A/P: SVT, abberant  Continue tele monitoring  Sotalol discontinued due to polymorphic WCT on telemetry, and QTc >500ms  Continue amiodarone oral loading PO.  QTc was 394ms on 6/2  Replace K, keep >4.0  Plan discussed with Dr. Singh. 73 yo male with HTN, PAF s/p several ablations in the past, presented with chest pain and palpitations.   found to be in SVT with rates 180-190bpm treated with cardizem and rates improved after single dose amiodarone.   EKG this morning shows atypical atrial flutter with controlled ventricular  rate, symptoms improved.     A/P: SVT, abberant  Continue tele monitoring  Sotalol discontinued due to polymorphic WCT on telemetry, and QTc >500ms  Continue amiodarone oral loading PO.  QTc was 394ms on 6/2  EKG in AM  Replace K, keep >4.0  Plan discussed with Dr. Singh.

## 2020-06-03 NOTE — CHART NOTE - NSCHARTNOTEFT_GEN_A_CORE
Called by RN for patient noted with Vtach for 2 minutes on tele, patient was in the bathroom during episode, which lasted a total of ~ 8 minutes after which patient converted to normal sinus rhythm with rate in 60s. Patient seen at bedside, with O2 via NC, in no apparent distress, has no complaints, asymptomatic during episode of Vtach. Patient denies headaches, dizziness, chest pain, shortness of breath, cough, palpitations, nausea, vomiting, or weakness. Patient on amiodarone, loading dose of 400mg administered overnight at ~ 9:00pm.       Initial Vitals:       Subsequent vitals:        PHYSICAL EXAM:  GENERAL: NAD, lying in bed comfortably  CHEST/LUNG: Clear to auscultation bilaterally; No rales, rhonchi, wheezing  HEART: Regular rate and rhythm  EXTREMITIES: No edema  NERVOUS SYSTEM:  Alert & Oriented X3, speech clear. No deficits     A/P  74 year old male patient with pertinent history of afib with prior ablations and cardioversions admitted after presenting with chest pain and SOB, found with SVT in the ED treated with amiodarone and cardizem drip. With episode of Vtach noted on tele, asymptomatic throughout episode, however with elevated blood pressure, which then normalized after patient converted to NSR.   -Continue amiodarone  -Continue to monitor closely  -Continue treatment as per orders  -Cardiology and EP following       Above discussed with Dr. Michaels PGY-3 Called by RN for patient noted with Vtach for 2 minutes on tele, patient was in the bathroom during episode, which lasted a total of ~ 8 minutes after which patient converted to normal sinus rhythm with rate in 60s. Patient seen at bedside, with O2 via NC, in no apparent distress, has no complaints, asymptomatic during episode of Vtach. Patient denies headaches, dizziness, chest pain, shortness of breath, cough, palpitations, nausea, vomiting, or weakness. Patient on amiodarone, loading dose of 400mg administered overnight at ~ 9:00pm.       Initial Vitals:   Bp: 155/114, HR: 189, RR: 20, SpO2: 100% on 2L O2 via NC  Bp: 161/77, HR: 161, RR: 20, SpO2: 100% on 2L O2 via NC      Subsequent vitals:   Vital Signs Last 24 Hrs  T(C): 36.4 (03 Jun 2020 04:00), Max: 36.8 (02 Jun 2020 16:05)  T(F): 97.6 (03 Jun 2020 04:00), Max: 98.3 (02 Jun 2020 16:05)  HR: 84 (03 Jun 2020 05:31) (68 - 189)  BP: 157/76 (03 Jun 2020 05:31) (127/72 - 166/82)  RR: 20 (03 Jun 2020 05:31) (18 - 20)  SpO2: 100% (03 Jun 2020 05:31) (96% - 100%)        PHYSICAL EXAM:  GENERAL: NAD, lying in bed comfortably  CHEST/LUNG: Clear to auscultation bilaterally; No rales, rhonchi, wheezing  HEART: Regular rate and rhythm  EXTREMITIES: No edema  NERVOUS SYSTEM:  Alert & Oriented X3, speech clear. No deficits     A/P  74 year old male patient with pertinent history of afib with prior ablations and cardioversions admitted after presenting with chest pain and SOB, found with SVT in the ED treated with amiodarone and cardizem drip. With episode of Vtach noted on tele, asymptomatic throughout episode, however with elevated blood pressure, which then normalized after patient converted to NSR.   -Continue amiodarone  -Continue to monitor closely  -Continue treatment as per orders  -Cardiology and EP following       Above discussed with Dr. Michaels PGY-3

## 2020-06-03 NOTE — PROGRESS NOTE ADULT - SUBJECTIVE AND OBJECTIVE BOX
HPI:  74 year old male patient with PMH HTN, s/p PPM implant in september 2019 by EP Dr Calles for high degree AVB, PAF with prior ablations x3 by Dr Garcia in [place] presented to the ED complaining of chest pain, palpitations and sob.  Patient endorses being compliant with his medications. Denies any recent fever, chills, recent travel, abdominal pain, nausea or vomiting.  In the ED patient found to be in SVT with heart rate in the 190s, Troponin of 0.084 and BNP of 5177 (31 May 2020 23:05), treated with adenosine without response, cardizem IV and drip and single dose of amiodarone.      6/3/20: Pt seen in bed this AM, awake, alert, asymptomatic of rapid heart rates.  Amio loading PO in progress.   TELE: reviewed tele strips with Dr. Singh, SVT with abberrancy noted.      MEDICATIONS  (STANDING):  aMIOdarone    Tablet 400 milliGRAM(s) Oral every 12 hours  aMIOdarone    Tablet   Oral   amLODIPine   Tablet 5 milliGRAM(s) Oral daily  furosemide   Injectable 40 milliGRAM(s) IV Push daily  isosorbide   mononitrate ER Tablet (IMDUR) 30 milliGRAM(s) Oral daily  pantoprazole    Tablet 40 milliGRAM(s) Oral before breakfast  predniSONE Oral Tab/Cap - Peds 2.5 milliGRAM(s) Oral daily  rivaroxaban 15 milliGRAM(s) Oral with dinner    MEDICATIONS  (PRN):    Allergies    No Known Allergies      Vital Signs Last 24 Hrs  T(C): 36.5 (03 Jun 2020 08:03), Max: 36.8 (02 Jun 2020 16:05)  T(F): 97.7 (03 Jun 2020 08:03), Max: 98.3 (02 Jun 2020 16:05)  HR: 84 (03 Jun 2020 08:03) (68 - 189)  BP: 106/50 (03 Jun 2020 08:03) (106/50 - 166/82)  BP(mean): --  RR: 19 (03 Jun 2020 08:03) (18 - 20)  SpO2: 96% (03 Jun 2020 08:03) (96% - 100%)                          10.7   8.82  )-----------( 209      ( 02 Jun 2020 08:13 )             36.0           06-03    143  |  110<H>  |  33<H>  ----------------------------<  120<H>  3.7   |  29  |  1.98<H>    Ca    9.5      03 Jun 2020 08:41  Mg     2.5     06-03          CARDIAC MARKERS ( 01 Jun 2020 06:24 )  0.085 ng/mL / x     / x     / x     / x      CARDIAC MARKERS ( 01 Jun 2020 00:37 )  0.087 ng/mL / x     / x     / x     / x      CARDIAC MARKERS ( 31 May 2020 20:19 )  0.084 ng/mL / x     / x     / x     / x            PHYSICAL EXAMINATION:  GENERAL: In no apparent distress, well nourished, and hydrated.  HEAD:  Atraumatic, Normocephalic  EYES: EOMI, PERRLA, conjunctiva and sclera clear  ENMT: No tonsillar erythema, exudates, or enlargement; Moist mucous membranes, Good dentition, No lesions  NECK: Supple and normal thyroid.  No JVD or carotid bruit.  Carotid pulse is 2+ bilaterally.  HEART: regular rate; No murmurs, rubs, or gallops.  PULMONARY: Clear to auscultation and perfusion.  No rales, wheezing, or rhonchi bilaterally.  ABDOMEN: Soft, Nontender, Nondistended; Bowel sounds present  EXTREMITIES:  2+ Peripheral Pulses, No clubbing, cyanosis, or edema  LYMPH: No lymphadenopathy noted  NEUROLOGICAL: Grossly nonfocal      ECHO: 6/1/2020       Fibrocalcific changes noted to the mitral valve leaflets with preserved   leaflet excursion. Mild to Moderate mitral regurgitation is present. EA   reversal of the mitral inflow consistent with reduced compliance of the   left ventricle.   Fibrocalcific changes noted to the Aortic valve leaflets with preserved   leaflet excursion.   Moderate (2+) aortic regurgitation is present.   The tricuspid valve leaflets are well seen and appear thin and pliable   with preserved leaflets excursion. Mild to Moderate Tricuspid   regurgitation is present.   Left ventricular wall motion is normal.   The left ventricleis normal in size.   Estimated left ventricular ejection fraction is 50-55 %.   The right atrium appears mildly dilated.   A device wire is seen in the RV and RA.   The right ventricle is normal in size, wall thickness, wall motion, and   contractility based on TAPSE and tissue doppler. HPI:  74 year old male patient with PMH HTN, s/p PPM implant in september 2019 by EP Dr Calles for high degree AVB, PAF with prior ablations x3 by Dr Garcia in [place] presented to the ED complaining of chest pain, palpitations and sob.  Patient endorses being compliant with his medications. Denies any recent fever, chills, recent travel, abdominal pain, nausea or vomiting.  In the ED patient found to be in SVT with heart rate in the 190s, Troponin of 0.084 and BNP of 5177 (31 May 2020 23:05), treated with adenosine without response, cardizem IV and drip and single dose of amiodarone.      6/3/20: Pt seen in bed this AM, awake, alert, asymptomatic of rapid heart rates.  Amio loading PO in progress.   TELE: reviewed tele strips with Dr. Singh, episodes of SVT with abberrancy noted.      PAST MEDICAL & SURGICAL HISTORY:  MPA (microscopic polyangiitis)  Pacemaker  Atrial fibrillation    SOCIAL HX: Denies smoking, alcohol or drug use      MEDICATIONS  (STANDING):  aMIOdarone    Tablet 400 milliGRAM(s) Oral every 12 hours  aMIOdarone    Tablet   Oral   amLODIPine   Tablet 5 milliGRAM(s) Oral daily  furosemide   Injectable 40 milliGRAM(s) IV Push daily  isosorbide   mononitrate ER Tablet (IMDUR) 30 milliGRAM(s) Oral daily  pantoprazole    Tablet 40 milliGRAM(s) Oral before breakfast  predniSONE Oral Tab/Cap - Peds 2.5 milliGRAM(s) Oral daily  rivaroxaban 15 milliGRAM(s) Oral with dinner    MEDICATIONS  (PRN):    Allergies    No Known Allergies            Vital Signs Last 24 Hrs  T(C): 36.5 (03 Jun 2020 08:03), Max: 36.8 (02 Jun 2020 16:05)  T(F): 97.7 (03 Jun 2020 08:03), Max: 98.3 (02 Jun 2020 16:05)  HR: 84 (03 Jun 2020 08:03) (68 - 189)  BP: 106/50 (03 Jun 2020 08:03) (106/50 - 166/82)  BP(mean): --  RR: 19 (03 Jun 2020 08:03) (18 - 20)  SpO2: 96% (03 Jun 2020 08:03) (96% - 100%)                          10.7   8.82  )-----------( 209      ( 02 Jun 2020 08:13 )             36.0           06-03    143  |  110<H>  |  33<H>  ----------------------------<  120<H>  3.7   |  29  |  1.98<H>    Ca    9.5      03 Jun 2020 08:41  Mg     2.5     06-03          CARDIAC MARKERS ( 01 Jun 2020 06:24 )  0.085 ng/mL / x     / x     / x     / x      CARDIAC MARKERS ( 01 Jun 2020 00:37 )  0.087 ng/mL / x     / x     / x     / x      CARDIAC MARKERS ( 31 May 2020 20:19 )  0.084 ng/mL / x     / x     / x     / x        ROS: Pt denies CP, SOB, dizziness, lightheadedness or palpitations.  Last diarrhea yesterday AM.  All other ROS negative      PHYSICAL EXAMINATION:  GENERAL: In no apparent distress, well nourished, and hydrated.  HEAD:  Atraumatic, Normocephalic  EYES: EOMI, PERRLA, conjunctiva and sclera clear  ENMT: No tonsillar erythema, exudates, or enlargement; Moist mucous membranes, Good dentition, No lesions  NECK: Supple and normal thyroid.  No JVD or carotid bruit.  Carotid pulse is 2+ bilaterally.  HEART: regular rate; No murmurs, rubs, or gallops.  PULMONARY: Clear to auscultation and perfusion.  No rales, wheezing, or rhonchi bilaterally.  ABDOMEN: Soft, Nontender, Nondistended; Bowel sounds present  EXTREMITIES:  2+ Peripheral Pulses, No clubbing, cyanosis, or edema  LYMPH: No lymphadenopathy noted  NEUROLOGICAL: Grossly nonfocal      ECHO: 6/1/2020       Fibrocalcific changes noted to the mitral valve leaflets with preserved   leaflet excursion. Mild to Moderate mitral regurgitation is present. EA   reversal of the mitral inflow consistent with reduced compliance of the   left ventricle.   Fibrocalcific changes noted to the Aortic valve leaflets with preserved   leaflet excursion.   Moderate (2+) aortic regurgitation is present.   The tricuspid valve leaflets are well seen and appear thin and pliable   with preserved leaflets excursion. Mild to Moderate Tricuspid   regurgitation is present.   Left ventricular wall motion is normal.   The left ventricleis normal in size.   Estimated left ventricular ejection fraction is 50-55 %.   The right atrium appears mildly dilated.   A device wire is seen in the RV and RA.   The right ventricle is normal in size, wall thickness, wall motion, and   contractility based on TAPSE and tissue doppler.

## 2020-06-03 NOTE — DISCHARGE NOTE NURSING/CASE MANAGEMENT/SOCIAL WORK - PATIENT PORTAL LINK FT
You can access the FollowMyHealth Patient Portal offered by [facility] by registering at the following website: [email or web address]. By joining [facility]’s FollowMyHealth portal, you will also be able to view your health information using other applications (apps) compatible with our system.

## 2020-06-04 ENCOUNTER — TRANSCRIPTION ENCOUNTER (OUTPATIENT)
Age: 75
End: 2020-06-04

## 2020-06-04 VITALS — HEART RATE: 62 BPM | SYSTOLIC BLOOD PRESSURE: 149 MMHG | DIASTOLIC BLOOD PRESSURE: 62 MMHG

## 2020-06-04 LAB
ANION GAP SERPL CALC-SCNC: 3 MMOL/L — LOW (ref 5–17)
BUN SERPL-MCNC: 39 MG/DL — HIGH (ref 7–23)
CALCIUM SERPL-MCNC: 9.3 MG/DL — SIGNIFICANT CHANGE UP (ref 8.5–10.1)
CHLORIDE SERPL-SCNC: 111 MMOL/L — HIGH (ref 96–108)
CO2 SERPL-SCNC: 30 MMOL/L — SIGNIFICANT CHANGE UP (ref 22–31)
CREAT SERPL-MCNC: 2.01 MG/DL — HIGH (ref 0.5–1.3)
GLUCOSE SERPL-MCNC: 100 MG/DL — HIGH (ref 70–99)
MAGNESIUM SERPL-MCNC: 2.4 MG/DL — SIGNIFICANT CHANGE UP (ref 1.6–2.6)
POTASSIUM SERPL-MCNC: 3.2 MMOL/L — LOW (ref 3.5–5.3)
POTASSIUM SERPL-SCNC: 3.2 MMOL/L — LOW (ref 3.5–5.3)
SODIUM SERPL-SCNC: 144 MMOL/L — SIGNIFICANT CHANGE UP (ref 135–145)

## 2020-06-04 PROCEDURE — 93010 ELECTROCARDIOGRAM REPORT: CPT

## 2020-06-04 PROCEDURE — 99239 HOSP IP/OBS DSCHRG MGMT >30: CPT

## 2020-06-04 RX ORDER — METOPROLOL TARTRATE 50 MG
1 TABLET ORAL
Qty: 30 | Refills: 0
Start: 2020-06-04 | End: 2020-07-03

## 2020-06-04 RX ORDER — FUROSEMIDE 40 MG
1 TABLET ORAL
Qty: 0 | Refills: 0 | DISCHARGE

## 2020-06-04 RX ORDER — AMIODARONE HYDROCHLORIDE 400 MG/1
400 TABLET ORAL EVERY 12 HOURS
Refills: 0 | Status: DISCONTINUED | OUTPATIENT
Start: 2020-06-04 | End: 2020-06-04

## 2020-06-04 RX ORDER — AMLODIPINE BESYLATE 2.5 MG/1
1 TABLET ORAL
Qty: 0 | Refills: 0 | DISCHARGE

## 2020-06-04 RX ORDER — FUROSEMIDE 40 MG
3 TABLET ORAL
Qty: 90 | Refills: 0
Start: 2020-06-04 | End: 2020-07-03

## 2020-06-04 RX ORDER — AMIODARONE HYDROCHLORIDE 400 MG/1
TABLET ORAL
Refills: 0 | Status: DISCONTINUED | OUTPATIENT
Start: 2020-06-04 | End: 2020-06-04

## 2020-06-04 RX ORDER — AMIODARONE HYDROCHLORIDE 400 MG/1
2 TABLET ORAL
Qty: 120 | Refills: 0
Start: 2020-06-04 | End: 2020-07-03

## 2020-06-04 RX ORDER — METOPROLOL TARTRATE 50 MG
1 TABLET ORAL
Qty: 0 | Refills: 0 | DISCHARGE

## 2020-06-04 RX ORDER — POTASSIUM CHLORIDE 20 MEQ
40 PACKET (EA) ORAL EVERY 4 HOURS
Refills: 0 | Status: COMPLETED | OUTPATIENT
Start: 2020-06-04 | End: 2020-06-04

## 2020-06-04 RX ORDER — AMLODIPINE BESYLATE 2.5 MG/1
1 TABLET ORAL
Qty: 0 | Refills: 0 | DISCHARGE
Start: 2020-06-04

## 2020-06-04 RX ORDER — AMIODARONE HYDROCHLORIDE 400 MG/1
400 TABLET ORAL ONCE
Refills: 0 | Status: COMPLETED | OUTPATIENT
Start: 2020-06-04 | End: 2020-06-04

## 2020-06-04 RX ADMIN — Medication 40 MILLIEQUIVALENT(S): at 14:06

## 2020-06-04 RX ADMIN — Medication 40 MILLIGRAM(S): at 10:20

## 2020-06-04 RX ADMIN — ISOSORBIDE MONONITRATE 30 MILLIGRAM(S): 60 TABLET, EXTENDED RELEASE ORAL at 10:21

## 2020-06-04 RX ADMIN — Medication 2.5 MILLIGRAM(S): at 10:22

## 2020-06-04 RX ADMIN — PANTOPRAZOLE SODIUM 40 MILLIGRAM(S): 20 TABLET, DELAYED RELEASE ORAL at 10:22

## 2020-06-04 RX ADMIN — AMLODIPINE BESYLATE 5 MILLIGRAM(S): 2.5 TABLET ORAL at 10:20

## 2020-06-04 RX ADMIN — Medication 40 MILLIEQUIVALENT(S): at 10:22

## 2020-06-04 RX ADMIN — AMIODARONE HYDROCHLORIDE 400 MILLIGRAM(S): 400 TABLET ORAL at 06:52

## 2020-06-04 NOTE — DISCHARGE NOTE PROVIDER - NSDCMRMEDTOKEN_GEN_ALL_CORE_FT
amiodarone 200 mg oral tablet: Take 2 tabs (400mg ) twice a day until the evening of 6/5. Start 1 tab (200mg) daily on 6/6 AM  amLODIPine 5 mg oral tablet: 1 tab(s) orally once a day  isosorbide mononitrate 30 mg oral tablet, extended release: 1 tab(s) orally once a day (in the morning)  Lasix 20 mg oral tablet: 3 tab(s) orally once a day   Myrbetriq 25 mg oral tablet, extended release: 1 tab(s) orally once a day  predniSONE 2.5 mg oral tablet: 1 tab(s) orally once a day  Protonix 40 mg oral delayed release tablet: 1 tab(s) orally once a day  Rituxan 10 mg/mL intravenous solution:   sulfamethoxazole-trimethoprim DS: 1 tab(s) orally 3 times a week    MWF**  Xarelto 15 mg oral tablet: 1 tab(s) orally once a day (in the evening) amiodarone 200 mg oral tablet: Take 2 tabs (400mg ) twice a day until the evening of 6/5. Start 1 tab (200mg) daily on 6/6 AM  amLODIPine 5 mg oral tablet: 1 tab(s) orally once a day  isosorbide mononitrate 30 mg oral tablet, extended release: 1 tab(s) orally once a day (in the morning)  Lasix 20 mg oral tablet: 3 tab(s) orally once a day   metoprolol succinate 25 mg oral tablet, extended release: 1 tab(s) orally once a day   Myrbetriq 25 mg oral tablet, extended release: 1 tab(s) orally once a day  predniSONE 2.5 mg oral tablet: 1 tab(s) orally once a day  Protonix 40 mg oral delayed release tablet: 1 tab(s) orally once a day  Rituxan 10 mg/mL intravenous solution:   sulfamethoxazole-trimethoprim DS: 1 tab(s) orally 3 times a week - [facility]**  Xarelto 15 mg oral tablet: 1 tab(s) orally once a day (in the evening) amiodarone 200 mg oral tablet: Take 2 tabs (400mg ) twice a day until the evening of 6/5. Start 1 tab (200mg) daily on 6/6 AM  amLODIPine 5 mg oral tablet: 1 tab(s) orally once a day  isosorbide mononitrate 30 mg oral tablet, extended release: 1 tab(s) orally once a day (in the morning)  Lasix 20 mg oral tablet: 3 tab(s) orally once a day   metoprolol succinate 25 mg oral tablet, extended release: 1 tab(s) orally once a day   Myrbetriq 25 mg oral tablet, extended release: 1 tab(s) orally once a day  predniSONE 2.5 mg oral tablet: 1 tab(s) orally once a day  Protonix 40 mg oral delayed release tablet: 1 tab(s) orally once a day  Rituxan 10 mg/mL intravenous solution:   sulfamethoxazole-trimethoprim DS: 1 tab(s) orally 3 times a week - [facility]**  Xarelto 15 mg oral tablet: 1 tab(s) orally once a day (in the evening) amiodarone 200 mg oral tablet: Take 2 tabs (400mg ) twice a day until the evening of 6/5. Start 1 tab (200mg) daily on 6/6 AM  amLODIPine 5 mg oral tablet: 1 tab(s) orally once a day  isosorbide mononitrate 30 mg oral tablet, extended release: 1 tab(s) orally once a day (in the morning)  Lasix 20 mg oral tablet: 3 tab(s) orally once a day   metoprolol succinate 25 mg oral tablet, extended release: 1 tab(s) orally once a day   Myrbetriq 25 mg oral tablet, extended release: 1 tab(s) orally once a day  predniSONE 2.5 mg oral tablet: 1 tab(s) orally once a day  Protonix 40 mg oral delayed release tablet: 1 tab(s) orally once a day  Rituxan 10 mg/mL intravenous solution:   sulfamethoxazole-trimethoprim DS: 1 tab(s) orally 3 times a week - [facility]**  Xarelto 15 mg oral tablet: 1 tab(s) orally once a day (in the evening) amiodarone 200 mg oral tablet: Take 2 tabs (400mg ) twice a day until the evening of 6/5. Start 1 tab (200mg) daily on 6/6 AM  amLODIPine 5 mg oral tablet: 1 tab(s) orally once a day  isosorbide mononitrate 30 mg oral tablet, extended release: 1 tab(s) orally once a day (in the morning)  Lasix 20 mg oral tablet: 3 tab(s) orally once a day   metoprolol succinate 25 mg oral tablet, extended release: 1 tab(s) orally once a day   Myrbetriq 25 mg oral tablet, extended release: 1 tab(s) orally once a day  predniSONE 2.5 mg oral tablet: 1 tab(s) orally once a day  Protonix 40 mg oral delayed release tablet: 1 tab(s) orally once a day  Rituxan 10 mg/mL intravenous solution:   sulfamethoxazole-trimethoprim DS: 1 tab(s) orally 3 times a week - [facility]**  Xarelto 15 mg oral tablet: 1 tab(s) orally once a day (in the evening)

## 2020-06-04 NOTE — DISCHARGE NOTE PROVIDER - CARE PROVIDER_API CALL
Ruddy Thomson  CARDIOVASCULAR DISEASE  [address] [facility]  [place] [address]  Phone: [phone]  Fax: [phone]  Follow Up Time: Ruddy Thomson  CARDIOVASCULAR DISEASE  [address]  [place] [address]  Phone: [phone]  Fax: [phone]  Follow Up Time:     Neil Singh  CARDIAC ELECTROPHYSIOLOGY  [address] [place] [address]  Phone: [phone]  Fax: [phone]  Follow Up Time:

## 2020-06-04 NOTE — DISCHARGE NOTE PROVIDER - PROVIDER TOKENS
PROVIDER:[TOKEN:[[id number]]] PROVIDER:[TOKEN:[[id number]]],PROVIDER:[TOKEN:[[id number]]]

## 2020-06-04 NOTE — DISCHARGE NOTE PROVIDER - HOSPITAL COURSE
CHIEF COMPLAINT/DIAGNOSIS: CP        HPI: 74 year old male patient with pertinent history of afib with prior ablations and cardioversions presented to the ED complaining of sudden onset of substernal chest pain associated with palpitations, sob, peripheral edema edema and significant  weight gain in the past 2-3 weeks. Patient endorses being compliant with his medications. Denies any recent fever, chills, recent travel, abdominal pain, nausea or vomiting. In the ED patient found to be in SVT with heart rate in the 190s, Troponin of 0.084 and BNP of 5177.        Overnight Events 6/3: RRT called for Vtach for 2 minutes on tele, patient was in the bathroom during episode, which lasted a total of ~ 8 minutes after which patient converted to normal sinus rhythm with rate in 60s. Patient asymptomatic.        SUBJECTIVE:    6/2 - c/o of diarrhea     6/3 - no c/o, sob improved. no diarrhea x24 hours     6/4 - no complaints.         REVIEW OF SYSTEMS:    All other review of systems is negative unless indicated above        PHYSICAL EXAM:    Constitutional: Awake and alert, well-developed    HEENT: Normal Hearing, MMM    Neck: Soft and supple, No LAD, No JVD    Respiratory: Breath sounds are clear bilaterally, No wheezing, rales or rhonchi    Cardiovascular: S1 and S2, regular rate and rhythm, no Murmurs, gallops or rubs    Gastrointestinal: Bowel Sounds present, soft, nontender, nondistended, no guarding, no rebound    Extremities: + pitting edema, +2/3 pitting edema    Vascular: 2+ peripheral pulses    Neurological: A/O x 3, no focal deficits    Musculoskeletal: 5/5 strength b/l upper and lower extremities    Skin: No rashes, no jaundice        Vital Signs: All reviewed    LABS: All Labs Reviewed    Radiology: All reviewed     Medications: All reviewed         Tele Review 6/4 -         ASSESSMENT AND PLAN:            1)  Chest pain r/o ACS    - trops mildly elevated x3 / BNP 5177    - ECG w/ ST abnormality     - likely demand in the setting of rapid HR and endocardial strain    - CXR w/ questionable small left-sided infiltrate, no leucocytosis, cough, fever -- observe off abx fo now     - cardio f/u appreciated        2)  h/o AFIB, SSS s/p ablation/PPM now with atrial tachycardia and torsades     - cont. tele monitoring. tele review as above.    - s/p IV cards gtt, PO sotalol     - switched sotalol to amiodarone, serial ECG's - cont. 400mg BID    - Cont. Xarelto for stroke PPX    - Cardio, EP Cx  f/u appreciated    6/3 - RRT this AM. Cont. tele.        3)  Acute on Chronic diastolic HF exacerbation    - Daily weight / low sodium diet / FR 1500mL    - Cont. IV Lasix QD    - Echo as above. EF 50-55%     - Cardio f/u appreciated    6/3 - weight loss noted.        4)  Diarrhea    - pt. on rituxin, ppx bactrim and prednisone    - d/c isolation, d/c r/o cdiff, GI PCR ~ no episodes of diarrhea x24 hours        5)  Microscopic polyangiitis vasculitis    - Cont. prednisone    - On Rituxan, next dose due in 09/2020    - according to patient, on PPX bactrim outpatient         6)  CKD Stage 3-4 -- stable     - cont. to trend renal function on diuretics         7) DVT PPX    - Cont. Xarelto CHIEF COMPLAINT/DIAGNOSIS: CP        HPI: 74 year old male patient with pertinent history of afib with prior ablations and cardioversions presented to the ED complaining of sudden onset of substernal chest pain associated with palpitations, sob, peripheral edema edema and significant  weight gain in the past 2-3 weeks. Patient endorses being compliant with his medications. Denies any recent fever, chills, recent travel, abdominal pain, nausea or vomiting. In the ED patient found to be in SVT with heart rate in the 190s, Troponin of 0.084 and BNP of 5177.        Overnight Events 6/3: RRT called for Vtach for 2 minutes on tele, patient was in the bathroom during episode, which lasted a total of ~ 8 minutes after which patient converted to normal sinus rhythm with rate in 60s. Patient asymptomatic.        SUBJECTIVE:    6/2 - c/o of diarrhea     6/3 - no c/o, sob improved. no diarrhea x24 hours     6/4 - no complaints.         REVIEW OF SYSTEMS:    All other review of systems is negative unless indicated above        PHYSICAL EXAM:    Constitutional: Awake and alert, well-developed    HEENT: Normal Hearing, MMM    Neck: Soft and supple, No LAD, No JVD    Respiratory: Breath sounds are clear bilaterally, No wheezing, rales or rhonchi    Cardiovascular: S1 and S2, regular rate and rhythm, no Murmurs, gallops or rubs    Gastrointestinal: Bowel Sounds present, soft, nontender, nondistended, no guarding, no rebound    Extremities: + pitting edema, +2/3 pitting edema    Vascular: 2+ peripheral pulses    Neurological: A/O x 3, no focal deficits    Musculoskeletal: 5/5 strength b/l upper and lower extremities    Skin: No rashes, no jaundice        Vital Signs: All reviewed    LABS: All Labs Reviewed    Radiology: All reviewed     Medications: All reviewed         Tele Review 6/4 -         ASSESSMENT AND PLAN:            1)  Chest pain r/o ACS    - trops mildly elevated x3 / BNP 5177    - ECG w/ ST abnormality     - likely demand in the setting of rapid HR and endocardial strain    - CXR w/ questionable small left-sided infiltrate, no leucocytosis, cough, fever -- observe off abx fo now     - cardio f/u appreciated        2)  h/o AFIB, SSS s/p ablation/PPM now with atrial tachycardia and torsades     - cont. tele monitoring. tele review as above.    - s/p IV cards gtt, PO sotalol     - switched sotalol to amiodarone, serial ECG's - cont. 400mg BID    - Cont. Xarelto for stroke PPX    - Cardio, EP Cx  f/u appreciated    6/3 - RRT this AM. Cont. tele.        3)  Acute on Chronic diastolic HF exacerbation    - Daily weight / low sodium diet / FR 1500mL    - Cont. IV Lasix QD    - Echo as above. EF 50-55%     - Cardio f/u appreciated    6/3 - weight loss noted.        4)  Diarrhea    - pt. on rituxin, ppx bactrim and prednisone    - d/c isolation, d/c r/o cdiff, GI PCR ~ no episodes of diarrhea x24 hours        5)  Microscopic polyangitis vasculitis    - Cont. prednisone    - On Rituxan, next dose due in 09/2020    - according to patient, on PPX bactrim outpatient         6)  CKD Stage 3-4 -- stable     - cont. to trend renal function on diuretics         7) DVT PPX    - Cont. Xarelto CHIEF COMPLAINT/DIAGNOSIS: CP        HPI: 74 year old male patient with pertinent history of afib with prior ablations and cardioversions presented to the ED complaining of sudden onset of substernal chest pain associated with palpitations, sob, peripheral edema edema and significant  weight gain in the past 2-3 weeks. Patient endorses being compliant with his medications. Denies any recent fever, chills, recent travel, abdominal pain, nausea or vomiting. In the ED patient found to be in SVT with heart rate in the 190s, Troponin of 0.084 and BNP of 5177.        Overnight Events 6/3: RRT called for Vtach for 2 minutes on tele, patient was in the bathroom during episode, which lasted a total of ~ 8 minutes after which patient converted to normal sinus rhythm with rate in 60s. Patient asymptomatic.        SUBJECTIVE:    6/2 - c/o of diarrhea     6/3 - no c/o, sob improved. no diarrhea x24 hours     6/4 - no complaints.         REVIEW OF SYSTEMS:    All other review of systems is negative unless indicated above        PHYSICAL EXAM:    Constitutional: Awake and alert, well-developed    HEENT: Normal Hearing, MMM    Neck: Soft and supple, No LAD, No JVD    Respiratory: Breath sounds are clear bilaterally, No wheezing, rales or rhonchi    Cardiovascular: S1 and S2, regular rate and rhythm, no Murmurs, gallops or rubs    Gastrointestinal: Bowel Sounds present, soft, nontender, nondistended, no guarding, no rebound    Extremities: + pitting edema, +2/3 pitting edema    Vascular: 2+ peripheral pulses    Neurological: A/O x 3, no focal deficits    Musculoskeletal: 5/5 strength b/l upper and lower extremities    Skin: No rashes, no jaundice        Vital Signs: All reviewed    LABS: All Labs Reviewed    Radiology: All reviewed     Medications: All reviewed         Tele Review 6/4 -         ASSESSMENT AND PLAN:            1)  Chest pain     - trops mildly elevated x3 / BNP 5177    - ECG w/ ST abnormality . unlikely to be ACS per cardiology     - likely demand ischemia in the setting of rapid HR and endocardial strain    - CXR w/ questionable small left-sided infiltrate, no leucocytosis, cough, fever -- observe off abx fo now     - cardio f/u appreciated        2)  h/o AFIB, SSS s/p ablation/PPM now with atrial tachycardia and torsades     - cont. tele monitoring. tele review as above.    - s/p IV cards gtt and PO sotalol     - switched sotalol to amiodarone, serial ECG's - cont. 400mg BID    - Cont. Xarelto for stroke PPX    - Cardio, EP Cx  f/u appreciated    6/3 - RRT this AM. Cont. tele.    6/4-cleared for d/c per cardiology. resuming BB at lower dose.        3)  Acute on Chronic diastolic HF exacerbation    - Daily weight / low sodium diet / FR 1500mL    - IV Lasix given. will go home on oral lasix 60 mg daily as rec by cardio    - Echo as above. EF 50-55%     - Cardio f/u appreciated    6/3 - weight loss noted.        4)  Diarrhea    - pt. on rituxin, ppx bactrim and prednisone    - resolved. unable to collect stool for cdiff/pcr. resolved.         5)  Microscopic polyangitis vasculitis    - Cont. prednisone    - On Rituxan, next dose due in 09/2020    - according to patient, on PPX bactrim outpatient         6)  CKD Stage 3-4 -- stable     - cont. to trend renal function on diuretics     - will need repeat BMP as outpatient. d/w patient        Dispo: discharge to home in stable condition        Final diagnosis, treatment plan, and follow-up recommendations were discussed and explained to the patient. The patient was given an opportunity to ask questions concerning the diagnosis and treatment plan. The patient acknowledged understanding of the diagnosis, treatment, and follow-up recommendations. The patient was advised to seek urgent care upon discharge if worsening symptoms develop prior to scheduled follow-up. Time spent on discharge included time with the patient, and also coordinating discharge care as outlined below.        Total time spent: 50 min

## 2020-06-04 NOTE — DISCHARGE NOTE PROVIDER - NSDCCPCAREPLAN_GEN_ALL_CORE_FT
PRINCIPAL DISCHARGE DIAGNOSIS  Diagnosis: Atrial fibrillation with RVR  Assessment and Plan of Treatment: You were admitted due to rapid atrial fibrillation which is a cardiac arrythmia. Continue to take *** to control your rate. Continue to take ___ for stroke prevention. Follow up with your cardiologist _ in 1 week. Continue to monitor your blood pressure and heart rate at home and keep a log for your cardiologist.      SECONDARY DISCHARGE DIAGNOSES  Diagnosis: HF (heart failure)  Assessment and Plan of Treatment: You had an acute exacerbation of your heart failure. Continue to monitor your weights at home daily. Maintain a low sodium diet and a fluid restriction of _ per day. Continue to take the following medications for your heart failure: _ Follow up with your cardiologist  _ within 1 week after discharge for further management. Continue to follow your CHF  Zone Chart in your CHF packet accordingly. Your discharge weight is PRINCIPAL DISCHARGE DIAGNOSIS  Diagnosis: SVT (supraventricular tachycardia)  Assessment and Plan of Treatment: You were found to have SVT which is a cardiac arrythmia  - Continue to Amiodarone (new medications, sent to pharmacy) -- Take 2 tabs (400mg ) twice a day until the evening of 6/5. Start 1 tab (200mg) daily on 6/6 AM  - Continue Xarelto for stroke prevention  - Follow up with your cardiologist Dr. Thomson on Tuesday, repeat labs tests on Tuesday      SECONDARY DISCHARGE DIAGNOSES  Diagnosis: HF (heart failure)  Assessment and Plan of Treatment: You had an acute exacerbation of your heart failure  - Continue to monitor your weights at home daily  - Maintain a low sodium diet and a fluid restriction of 1500mL per day  - Continue to Amiodarone (new medications, sent to pharmacy) and Metoprolol 25mg (dose reduced, sent to pharmacy)   - Follow up with your cardiologist Dr. Thomson on Tuesday   - Continue to follow your CHF Zone Chart in your CHF packet accordingly  - Your discharge weight is 109.3 kg    Diagnosis: Vasculitis  Assessment and Plan of Treatment: - Cont. prednisone as prescribed  - On Rituxan, next dose due in 09/2020 PRINCIPAL DISCHARGE DIAGNOSIS  Diagnosis: SVT (supraventricular tachycardia)  Assessment and Plan of Treatment: You were found to have SVT which is a cardiac arrythmia  - Continue to Amiodarone (new medications, sent to pharmacy) -- Take 2 tabs (400mg ) twice a day until the evening of 6/5. Start 1 tab (200mg) daily on 6/6 AM  - Continue Xarelto for stroke prevention  - Follow up with your cardiologist Dr. Thomson on Tuesday, repeat labs tests on Tuesday      SECONDARY DISCHARGE DIAGNOSES  Diagnosis: HF (heart failure)  Assessment and Plan of Treatment: You had an acute exacerbation of your heart failure  - Continue to monitor your weights at home daily  - Maintain a low sodium diet and a fluid restriction of 1500mL per day  - Continue to Amiodarone (new medications, sent to pharmacy) and Metoprolol 25mg (dose reduced, sent to pharmacy)   - Follow up with your cardiologist Dr. Thomson on Tuesday   - Continue to follow your CHF Zone Chart in your CHF packet accordingly  - Your discharge weight is 240lbs.    Diagnosis: Vasculitis  Assessment and Plan of Treatment: - Cont. prednisone as prescribed  - On Rituxan, next dose due in 09/2020

## 2020-06-04 NOTE — PROGRESS NOTE ADULT - ASSESSMENT
74 M with  AF s/p ablation  microscopic polyangiitis presents with acute onset chest pain and racing heart      Atrial tachycardia-  with espside torsade- stopped sotalol and started  amio  400 BID- continue to monitor closely on tele- has recieved 9/12 doses of 400 BID, then 200 mg thereafter   daily EKG and QTC monitering -      telemetry much improved compared to yesterday- patient remains asymptomatic      small elevation in CE are due to very rapid HR and endocardial strain-  as Trop are flat- it is unlikely to be  ACS       keep k + > 4 and mg > 2    DC planning hopefully today       continue tele [place]

## 2020-06-04 NOTE — PROGRESS NOTE ADULT - SUBJECTIVE AND OBJECTIVE BOX
CHIEF COMPLAINT: Patient is a 74y old  Male who presents with a chief complaint of Chest pain  Elevated troponin (31 May 2020 23:05)      HPI:  74 year old male patient with pertinent history of afib with prior ablations and cardioversions presented to the ED complaining of sudden onset of substernal chest pain associated with palpitations, sob, peripheral edema edema and significant  weight gain in the past 2-3 weeks. Patient endorses being compliant with his medications. Denies any recent fever, chills, recent travel, abdominal pain, nausea or vomiting.      In the ED patient found to be in SVT with heart rate in the 190s, Troponin of 0.084 and BNP of 5177 (31 May 2020 23:05)      6/2/2020- torsade on tele- no acute complaints from patient   6/3/2020-- tachycardia overnight with appearance of VT but same speed as his SVT/ Atach  6/4- telemetry less active overnight very short SVT episodes       PMHx: PAST MEDICAL & SURGICAL HISTORY:  MPA (microscopic polyangiitis)  Pacemaker  Atrial fibrillation            REVIEW OF SYSTEMS:    CONSTITUTIONAL: No weakness, fevers or chills  EYES/ENT: No visual changes;  No vertigo or throat pain   NECK: No pain or stiffness  RESPIRATORY: No cough, wheezing, hemoptysis; No shortness of breath  CARDIOVASCULAR: No chest pain or palpitations  GASTROINTESTINAL: No abdominal or epigastric pain. No nausea, vomiting, or hematemesis; No diarrhea or constipation. No melena or hematochezia.  GENITOURINARY: No dysuria, frequency or hematuria  NEUROLOGICAL: No numbness or weakness  SKIN: No itching, burning, rashes, or lesions   All other review of systems is negative unless indicated above      ICU Vital Signs Last 24 Hrs  T(C): 37.2 (03 Jun 2020 16:48), Max: 37.2 (03 Jun 2020 16:48)  T(F): 98.9 (03 Jun 2020 16:48), Max: 98.9 (03 Jun 2020 16:48)  HR: 64 (03 Jun 2020 21:04) (59 - 84)  BP: 141/62 (03 Jun 2020 21:04) (106/50 - 141/62)  BP(mean): 77 (03 Jun 2020 21:04) (77 - 77)  ABP: --  ABP(mean): --  RR: 19 (03 Jun 2020 08:03) (19 - 19)  SpO2: 98% (03 Jun 2020 16:48) (96% - 98%)      PHYSICAL EXAM:   Constitutional: NAD, awake and alert, well-developed  HEENT: PERR, EOMI, Normal Hearing, MMM  Neck: Soft and supple, No LAD, No JVD  Respiratory: Breath sounds are clear bilaterally, No wheezing, rales or rhonchi  Cardiovascular: S1 and S2, regular rate and rhythm, no Murmurs, gallops or rubs  Gastrointestinal: Bowel Sounds present, soft, nontender, nondistended, no guarding, no rebound  Extremities: No peripheral edema  Vascular: 2+ peripheral pulses  Neurological: A/O x 3, no focal deficits  Musculoskeletal: 5/5 strength b/l upper and lower extremities  Skin: No rashes    MEDICATIONS  (STANDING):  aMIOdarone    Tablet 400 milliGRAM(s) Oral every 12 hours  amLODIPine   Tablet 5 milliGRAM(s) Oral daily  furosemide   Injectable 40 milliGRAM(s) IV Push daily  isosorbide   mononitrate ER Tablet (IMDUR) 30 milliGRAM(s) Oral daily  pantoprazole    Tablet 40 milliGRAM(s) Oral before breakfast  predniSONE Oral Tab/Cap - Peds 2.5 milliGRAM(s) Oral daily  rivaroxaban 15 milliGRAM(s) Oral with dinner    MEDICATIONS  (PRN):           06-03    143  |  110<H>  |  33<H>  ----------------------------<  120<H>  3.7   |  29  |  1.98<H>    Ca    9.5      03 Jun 2020 08:41  Mg     2.5     06-03                          10.7   8.82  )-----------( 209      ( 02 Jun 2020 08:13 )             36.0     EKG: SVT  lateral ST changes     Telemetry: SVT -180 wiht varible block, PVCs     ECHO: < from: TTE Echo Complete w/o contrast w/ Doppler (06.01.20 @ 13:51) >   Summary     Fibrocalcific changes noted to the mitral valve leaflets with preserved   leaflet excursion. Mild to Moderate mitral regurgitation is present. EA   reversal of the mitral inflow consistent with reduced compliance of the   left ventricle.   Fibrocalcific changes noted to the Aortic valve leaflets with preserved   leaflet excursion.   Moderate (2+) aortic regurgitation is present.   The tricuspid valve leaflets are well seen and appear thin and pliable   with preserved leaflets excursion. Mild to Moderate Tricuspid   regurgitation is present.   Left ventricular wall motion is normal.   The left ventricleis normal in size.   Estimated left ventricular ejection fraction is 50-55 %.   The right atrium appears mildly dilated.   A device wire is seen in the RV and RA.   The right ventricle is normal in size, wall thickness, wall motion, and   contractility based on TAPSE and tissue doppler.     Signature     ----------------------------------------------------------------   Electronically signed by Cristhian Oro MD(Interpreting   physician) on 06/01/2020 04:16 PM   ----------------------------------------------------------------    < end of copied text >

## 2020-06-04 NOTE — PROGRESS NOTE ADULT - REASON FOR ADMISSION
Chest pain  Elevated troponin
Chest pain / Elevated troponin
Chest pain  Elevated troponin

## 2020-06-04 NOTE — DISCHARGE NOTE PROVIDER - NSDCFUADDINST_GEN_ALL_CORE_FT
PCP- follow up in 1 week. Bring these papers with you to that appointment so your PCP can request records from your hospital stay. PCP- follow up in 1 week. Bring these papers with you to that appointment so your PCP can request records from your hospital stay.    you need repeat blood test (BMP) on Monday or Tuesday to monitor your kidney function and electrolytes. please ask your PCP/cardiologist or nephrologist to order the test

## 2020-06-05 LAB
SARS-COV-2 IGG SERPL QL IA: NEGATIVE — SIGNIFICANT CHANGE UP
SARS-COV-2 IGM SERPL IA-ACNC: 0.13 INDEX — SIGNIFICANT CHANGE UP

## 2020-06-08 DIAGNOSIS — Z79.01 LONG TERM (CURRENT) USE OF ANTICOAGULANTS: ICD-10-CM

## 2020-06-08 DIAGNOSIS — Z95.0 PRESENCE OF CARDIAC PACEMAKER: ICD-10-CM

## 2020-06-08 DIAGNOSIS — I13.0 HYPERTENSIVE HEART AND CHRONIC KIDNEY DISEASE WITH HEART FAILURE AND STAGE 1 THROUGH STAGE 4 CHRONIC KIDNEY DISEASE, OR UNSPECIFIED CHRONIC KIDNEY DISEASE: ICD-10-CM

## 2020-06-08 DIAGNOSIS — I47.1 SUPRAVENTRICULAR TACHYCARDIA: ICD-10-CM

## 2020-06-08 DIAGNOSIS — I48.4 ATYPICAL ATRIAL FLUTTER: ICD-10-CM

## 2020-06-08 DIAGNOSIS — M31.7 MICROSCOPIC POLYANGIITIS: ICD-10-CM

## 2020-06-08 DIAGNOSIS — I49.5 SICK SINUS SYNDROME: ICD-10-CM

## 2020-06-08 DIAGNOSIS — I50.33 ACUTE ON CHRONIC DIASTOLIC (CONGESTIVE) HEART FAILURE: ICD-10-CM

## 2020-06-08 DIAGNOSIS — N18.4 CHRONIC KIDNEY DISEASE, STAGE 4 (SEVERE): ICD-10-CM

## 2020-06-08 DIAGNOSIS — I48.0 PAROXYSMAL ATRIAL FIBRILLATION: ICD-10-CM

## 2020-06-08 DIAGNOSIS — I24.8 OTHER FORMS OF ACUTE ISCHEMIC HEART DISEASE: ICD-10-CM

## 2020-06-20 ENCOUNTER — EMERGENCY (EMERGENCY)
Facility: HOSPITAL | Age: 75
LOS: 0 days | Discharge: HOME | End: 2020-06-20
Attending: EMERGENCY MEDICINE
Payer: MEDICARE

## 2020-06-20 VITALS
HEART RATE: 75 BPM | TEMPERATURE: 99 F | DIASTOLIC BLOOD PRESSURE: 70 MMHG | RESPIRATION RATE: 18 BRPM | OXYGEN SATURATION: 98 % | SYSTOLIC BLOOD PRESSURE: 138 MMHG

## 2020-06-20 VITALS — WEIGHT: 242.95 LBS

## 2020-06-20 DIAGNOSIS — R00.2 PALPITATIONS: ICD-10-CM

## 2020-06-20 DIAGNOSIS — Z79.01 LONG TERM (CURRENT) USE OF ANTICOAGULANTS: ICD-10-CM

## 2020-06-20 DIAGNOSIS — M31.7 MICROSCOPIC POLYANGIITIS: ICD-10-CM

## 2020-06-20 DIAGNOSIS — I48.91 UNSPECIFIED ATRIAL FIBRILLATION: ICD-10-CM

## 2020-06-20 DIAGNOSIS — Z95.0 PRESENCE OF CARDIAC PACEMAKER: ICD-10-CM

## 2020-06-20 LAB
ALBUMIN SERPL ELPH-MCNC: 3.7 G/DL — SIGNIFICANT CHANGE UP (ref 3.3–5)
ALP SERPL-CCNC: 45 U/L — SIGNIFICANT CHANGE UP (ref 40–120)
ALT FLD-CCNC: 31 U/L — SIGNIFICANT CHANGE UP (ref 12–78)
ANION GAP SERPL CALC-SCNC: 5 MMOL/L — SIGNIFICANT CHANGE UP (ref 5–17)
AST SERPL-CCNC: 14 U/L — LOW (ref 15–37)
BASOPHILS # BLD AUTO: 0.07 K/UL — SIGNIFICANT CHANGE UP (ref 0–0.2)
BASOPHILS NFR BLD AUTO: 0.6 % — SIGNIFICANT CHANGE UP (ref 0–2)
BILIRUB SERPL-MCNC: 0.8 MG/DL — SIGNIFICANT CHANGE UP (ref 0.2–1.2)
BUN SERPL-MCNC: 41 MG/DL — HIGH (ref 7–23)
CALCIUM SERPL-MCNC: 9.7 MG/DL — SIGNIFICANT CHANGE UP (ref 8.5–10.1)
CHLORIDE SERPL-SCNC: 109 MMOL/L — HIGH (ref 96–108)
CO2 SERPL-SCNC: 29 MMOL/L — SIGNIFICANT CHANGE UP (ref 22–31)
CREAT SERPL-MCNC: 2.03 MG/DL — HIGH (ref 0.5–1.3)
EOSINOPHIL # BLD AUTO: 0.13 K/UL — SIGNIFICANT CHANGE UP (ref 0–0.5)
EOSINOPHIL NFR BLD AUTO: 1.1 % — SIGNIFICANT CHANGE UP (ref 0–6)
GLUCOSE SERPL-MCNC: 113 MG/DL — HIGH (ref 70–99)
HCT VFR BLD CALC: 37.1 % — LOW (ref 39–50)
HGB BLD-MCNC: 10.9 G/DL — LOW (ref 13–17)
IMM GRANULOCYTES NFR BLD AUTO: 2.9 % — HIGH (ref 0–1.5)
LYMPHOCYTES # BLD AUTO: 0.96 K/UL — LOW (ref 1–3.3)
LYMPHOCYTES # BLD AUTO: 8.1 % — LOW (ref 13–44)
MAGNESIUM SERPL-MCNC: 2.6 MG/DL — SIGNIFICANT CHANGE UP (ref 1.6–2.6)
MCHC RBC-ENTMCNC: 25.1 PG — LOW (ref 27–34)
MCHC RBC-ENTMCNC: 29.4 GM/DL — LOW (ref 32–36)
MCV RBC AUTO: 85.3 FL — SIGNIFICANT CHANGE UP (ref 80–100)
MONOCYTES # BLD AUTO: 0.83 K/UL — SIGNIFICANT CHANGE UP (ref 0–0.9)
MONOCYTES NFR BLD AUTO: 7 % — SIGNIFICANT CHANGE UP (ref 2–14)
NEUTROPHILS # BLD AUTO: 9.51 K/UL — HIGH (ref 1.8–7.4)
NEUTROPHILS NFR BLD AUTO: 80.3 % — HIGH (ref 43–77)
PLATELET # BLD AUTO: 269 K/UL — SIGNIFICANT CHANGE UP (ref 150–400)
POTASSIUM SERPL-MCNC: 3.2 MMOL/L — LOW (ref 3.5–5.3)
POTASSIUM SERPL-SCNC: 3.2 MMOL/L — LOW (ref 3.5–5.3)
PROT SERPL-MCNC: 6.6 GM/DL — SIGNIFICANT CHANGE UP (ref 6–8.3)
RBC # BLD: 4.35 M/UL — SIGNIFICANT CHANGE UP (ref 4.2–5.8)
RBC # FLD: 17.2 % — HIGH (ref 10.3–14.5)
SODIUM SERPL-SCNC: 143 MMOL/L — SIGNIFICANT CHANGE UP (ref 135–145)
TROPONIN I SERPL-MCNC: <0.015 NG/ML — SIGNIFICANT CHANGE UP (ref 0.01–0.04)
WBC # BLD: 11.84 K/UL — HIGH (ref 3.8–10.5)
WBC # FLD AUTO: 11.84 K/UL — HIGH (ref 3.8–10.5)

## 2020-06-20 PROCEDURE — 96374 THER/PROPH/DIAG INJ IV PUSH: CPT

## 2020-06-20 PROCEDURE — 36415 COLL VENOUS BLD VENIPUNCTURE: CPT

## 2020-06-20 PROCEDURE — 99291 CRITICAL CARE FIRST HOUR: CPT

## 2020-06-20 PROCEDURE — 83735 ASSAY OF MAGNESIUM: CPT

## 2020-06-20 PROCEDURE — 99291 CRITICAL CARE FIRST HOUR: CPT | Mod: 25

## 2020-06-20 PROCEDURE — 80053 COMPREHEN METABOLIC PANEL: CPT

## 2020-06-20 PROCEDURE — 85025 COMPLETE CBC W/AUTO DIFF WBC: CPT

## 2020-06-20 PROCEDURE — 71045 X-RAY EXAM CHEST 1 VIEW: CPT

## 2020-06-20 PROCEDURE — 93971 EXTREMITY STUDY: CPT | Mod: LT

## 2020-06-20 PROCEDURE — 84484 ASSAY OF TROPONIN QUANT: CPT

## 2020-06-20 PROCEDURE — 93010 ELECTROCARDIOGRAM REPORT: CPT

## 2020-06-20 PROCEDURE — 71045 X-RAY EXAM CHEST 1 VIEW: CPT | Mod: 26

## 2020-06-20 PROCEDURE — 93971 EXTREMITY STUDY: CPT | Mod: 26,LT

## 2020-06-20 PROCEDURE — 93005 ELECTROCARDIOGRAM TRACING: CPT

## 2020-06-20 RX ORDER — DILTIAZEM HCL 120 MG
30 CAPSULE, EXT RELEASE 24 HR ORAL ONCE
Refills: 0 | Status: DISCONTINUED | OUTPATIENT
Start: 2020-06-20 | End: 2020-06-20

## 2020-06-20 RX ORDER — DILTIAZEM HCL 120 MG
10 CAPSULE, EXT RELEASE 24 HR ORAL ONCE
Refills: 0 | Status: COMPLETED | OUTPATIENT
Start: 2020-06-20 | End: 2020-06-20

## 2020-06-20 RX ORDER — SODIUM,POTASSIUM PHOSPHATES 278-250MG
1 POWDER IN PACKET (EA) ORAL ONCE
Refills: 0 | Status: COMPLETED | OUTPATIENT
Start: 2020-06-20 | End: 2020-06-20

## 2020-06-20 RX ADMIN — Medication 10 MILLIGRAM(S): at 13:30

## 2020-06-20 RX ADMIN — Medication 1 TABLET(S): at 15:41

## 2020-06-20 NOTE — ED PROVIDER NOTE - CLINICAL SUMMARY MEDICAL DECISION MAKING FREE TEXT BOX
rate control, ultrasound as per intake, reassess. rate control, ultrasound as per intake, reassess-->rate resolved with IV cardizem.  monitored.

## 2020-06-20 NOTE — ED STATDOCS - PROGRESS NOTE DETAILS
Avila ARMENTA for Dr. Green: 73 y/o male with known hx of A-fib, was lifting dog onto counter to get washed this morning then felt palpitations and pulse rate went up. Denies chest pain, shortness of breath. Does note L lower extremity swelling greater than R which is typical for him. Will send pt to main ED for further evaluation, given pt is tachy to rate of 162.

## 2020-06-20 NOTE — ED ADULT TRIAGE NOTE - CHIEF COMPLAINT QUOTE
pt presents to ED due to HTN pt states when he checked it at home it was 180/100 pt states he is on medication for HTN denies blurry vision or HA

## 2020-06-20 NOTE — ED PROVIDER NOTE - OBJECTIVE STATEMENT
75 y/o male with known hx of A-fib, was lifting dog onto counter to get washed this morning then felt palpitations and pulse rate went up. Denies chest pain, shortness of breath. Does note L lower extremity swelling greater than R which is typical for him. Pt states it is normal for him to have palpitations after exertion but it resolves shortly, this episode lasted longer than usual and noted some HTN. Had recent iron infusion from hematologist. PMD: Ruddy Thomson. Universal Safety Interventions

## 2020-06-20 NOTE — ED ADULT NURSE NOTE - OBJECTIVE STATEMENT
pt is 73 yo male with pmhx of AFIB presents to ED for elevated HR, pt's wife noted high HR after pt lifted their dog, pt denies palpitations, chest pain, dizziness, sob, nvd.

## 2020-06-20 NOTE — ED PROVIDER NOTE - CARE PROVIDER_API CALL
Ruddy Thomson  CARDIOVASCULAR DISEASE  [address]  [place] [address]  Phone: [phone]  Fax: [phone]  Follow Up Time:

## 2020-06-20 NOTE — ED PROVIDER NOTE - PATIENT PORTAL LINK FT
You can access the FollowMyHealth Patient Portal offered by [facility] by registering at the following website: [email or web address]. By joining [facility]’s FollowMyHealth portal, you will also be able to view your health information using other applications (apps) compatible with our system.

## 2020-06-20 NOTE — ED ADULT NURSE NOTE - NSIMPLEMENTINTERV_GEN_ALL_ED
----- Message from Kely Duarte sent at 12/29/2017  4:14 PM CST -----  Contact: [phone]/ self   Patient requesting to speak with you regarding setting up a sleep study. Please advise.     Implemented All Universal Safety Interventions:  [place] to call system. Call bell, personal items and telephone within reach. Instruct patient to call for assistance. Room bathroom lighting operational. Non-slip footwear when patient is off stretcher. Physically safe environment: no spills, clutter or unnecessary equipment. Stretcher in lowest position, wheels locked, appropriate side rails in place.

## 2020-06-20 NOTE — ED PROVIDER NOTE - PROGRESS NOTE DETAILS
Rate and rhythm resolved with 10mg IV cardizem.  pt denies missing med dose.  feels much better.  will monitor and get US as ordered by intake recevied sign out from dr. farmer pending sono resuilt and cxr, reEval. pt remains well. HR 70-80. pt feels well. sono negative. cxr negative. has apptmt with  , his cardiologist, on Tuesday 6/23/20. MD MAITE

## 2020-06-20 NOTE — ED ADULT NURSE NOTE - NSFALLRSKASSESSDT_ED_ALL_ED
Home Care Instructions Pain Management:    1.  DIET:    You may resume your normal diet today.    2.  BATHING:    You may shower with luke warm water.    3.  DRESSING:    You may remove your bandage today.    4.  ACTIVITY LEVEL:      You may resume your normal activities 24 hours after your procedure.    5.  MEDICATIONS:    You may resume your normal medications today.    6.  SPECIAL INSTRUCTIONS:    No heat to the injection site for 24 hours including bath or shower, heating pad, moist heat or hot tubs.    Use an ice pack to the injection site for any pain or discomfort.  Apply ice packs for 20 minute intervals as needed.    If you have received any sedatives by mouth today, you can not drive for 12 hours.    If you have received sedation through an IV, you can not drive for 24 hours.    PLEASE CALL YOUR DOCTOR FOR THE FOLLOWIN.  Redness or swelling around the injection site.  2.  Fever of 101 degrees.  3.  Drainage (pus) from the injection site.  4.  For any continuous bleeding (some dried blood over the incision is normal.)    FOR EMERGENCIES:    If any unusual problems or difficulties occur during clinic hours, call [phone] or dial [phone].    Follow up with with your physician in 2-3 weeks.      20-Jun-2020 13:46

## 2020-06-20 NOTE — ED PROVIDER NOTE - NSFOLLOWUPINSTRUCTIONS_ED_ALL_ED_FT
Continue your usual medicines.     Return to the Emergency Department for any concerns.     You were seen for elevated heart rate, which resolved in the Emergency Department.

## 2020-06-20 NOTE — ED ADULT NURSE NOTE - CHPI ED NUR SYMPTOMS NEG
no chest pain/no chills/no diaphoresis/no vomiting/no shortness of breath/no dizziness/no congestion/no back pain/no fever/no nausea/no syncope

## 2020-06-20 NOTE — ED ADULT NURSE REASSESSMENT NOTE - NS ED NURSE REASSESS COMMENT FT1
pt received cardizem 10mg IVP, pt's hr dropped to 80s, repeat ekg done, md farmer made aware, pt denies any cp, palpitations, sob, nvd, no s/sx of distress noted.  will con't to monitor

## 2021-03-04 ENCOUNTER — EMERGENCY (EMERGENCY)
Facility: HOSPITAL | Age: 76
LOS: 0 days | Discharge: ANOTHER HOSPITAL | End: 2021-03-04
Attending: EMERGENCY MEDICINE
Payer: MEDICARE

## 2021-03-04 VITALS
HEART RATE: 79 BPM | DIASTOLIC BLOOD PRESSURE: 78 MMHG | TEMPERATURE: 98 F | SYSTOLIC BLOOD PRESSURE: 171 MMHG | RESPIRATION RATE: 20 BRPM | OXYGEN SATURATION: 98 %

## 2021-03-04 VITALS
SYSTOLIC BLOOD PRESSURE: 189 MMHG | RESPIRATION RATE: 22 BRPM | TEMPERATURE: 98 F | WEIGHT: 165.35 LBS | HEART RATE: 87 BPM | HEIGHT: 78 IN | OXYGEN SATURATION: 97 % | DIASTOLIC BLOOD PRESSURE: 73 MMHG

## 2021-03-04 DIAGNOSIS — Z79.01 LONG TERM (CURRENT) USE OF ANTICOAGULANTS: ICD-10-CM

## 2021-03-04 DIAGNOSIS — R31.9 HEMATURIA, UNSPECIFIED: ICD-10-CM

## 2021-03-04 DIAGNOSIS — Z98.890 OTHER SPECIFIED POSTPROCEDURAL STATES: ICD-10-CM

## 2021-03-04 DIAGNOSIS — Z95.0 PRESENCE OF CARDIAC PACEMAKER: ICD-10-CM

## 2021-03-04 DIAGNOSIS — I10 ESSENTIAL (PRIMARY) HYPERTENSION: ICD-10-CM

## 2021-03-04 DIAGNOSIS — R33.9 RETENTION OF URINE, UNSPECIFIED: ICD-10-CM

## 2021-03-04 DIAGNOSIS — I48.91 UNSPECIFIED ATRIAL FIBRILLATION: ICD-10-CM

## 2021-03-04 DIAGNOSIS — R10.30 LOWER ABDOMINAL PAIN, UNSPECIFIED: ICD-10-CM

## 2021-03-04 DIAGNOSIS — M31.7 MICROSCOPIC POLYANGIITIS: ICD-10-CM

## 2021-03-04 DIAGNOSIS — Z20.822 CONTACT WITH AND (SUSPECTED) EXPOSURE TO COVID-19: ICD-10-CM

## 2021-03-04 LAB
ALBUMIN SERPL ELPH-MCNC: 3.9 G/DL — SIGNIFICANT CHANGE UP (ref 3.3–5)
ALP SERPL-CCNC: 76 U/L — SIGNIFICANT CHANGE UP (ref 40–120)
ALT FLD-CCNC: 20 U/L — SIGNIFICANT CHANGE UP (ref 12–78)
ANION GAP SERPL CALC-SCNC: 9 MMOL/L — SIGNIFICANT CHANGE UP (ref 5–17)
APPEARANCE UR: ABNORMAL
APTT BLD: 28.9 SEC — SIGNIFICANT CHANGE UP (ref 27.5–35.5)
AST SERPL-CCNC: 29 U/L — SIGNIFICANT CHANGE UP (ref 15–37)
BASOPHILS # BLD AUTO: 0.14 K/UL — SIGNIFICANT CHANGE UP (ref 0–0.2)
BASOPHILS NFR BLD AUTO: 1.3 % — SIGNIFICANT CHANGE UP (ref 0–2)
BILIRUB SERPL-MCNC: 0.6 MG/DL — SIGNIFICANT CHANGE UP (ref 0.2–1.2)
BILIRUB UR-MCNC: ABNORMAL
BUN SERPL-MCNC: 28 MG/DL — HIGH (ref 7–23)
CALCIUM SERPL-MCNC: 9.5 MG/DL — SIGNIFICANT CHANGE UP (ref 8.5–10.1)
CHLORIDE SERPL-SCNC: 110 MMOL/L — HIGH (ref 96–108)
CO2 SERPL-SCNC: 25 MMOL/L — SIGNIFICANT CHANGE UP (ref 22–31)
COLOR SPEC: ABNORMAL
CREAT SERPL-MCNC: 2.26 MG/DL — HIGH (ref 0.5–1.3)
DIFF PNL FLD: ABNORMAL
EOSINOPHIL # BLD AUTO: 0.46 K/UL — SIGNIFICANT CHANGE UP (ref 0–0.5)
EOSINOPHIL NFR BLD AUTO: 4.2 % — SIGNIFICANT CHANGE UP (ref 0–6)
GLUCOSE SERPL-MCNC: 125 MG/DL — HIGH (ref 70–99)
GLUCOSE UR QL: NEGATIVE MG/DL — SIGNIFICANT CHANGE UP
HCT VFR BLD CALC: 37.7 % — LOW (ref 39–50)
HGB BLD-MCNC: 12.7 G/DL — LOW (ref 13–17)
IMM GRANULOCYTES NFR BLD AUTO: 0.6 % — SIGNIFICANT CHANGE UP (ref 0–1.5)
INR BLD: 1.41 RATIO — HIGH (ref 0.88–1.16)
KETONES UR-MCNC: ABNORMAL
LEUKOCYTE ESTERASE UR-ACNC: ABNORMAL
LYMPHOCYTES # BLD AUTO: 0.76 K/UL — LOW (ref 1–3.3)
LYMPHOCYTES # BLD AUTO: 7 % — LOW (ref 13–44)
MCHC RBC-ENTMCNC: 30.8 PG — SIGNIFICANT CHANGE UP (ref 27–34)
MCHC RBC-ENTMCNC: 33.7 GM/DL — SIGNIFICANT CHANGE UP (ref 32–36)
MCV RBC AUTO: 91.3 FL — SIGNIFICANT CHANGE UP (ref 80–100)
MONOCYTES # BLD AUTO: 0.65 K/UL — SIGNIFICANT CHANGE UP (ref 0–0.9)
MONOCYTES NFR BLD AUTO: 6 % — SIGNIFICANT CHANGE UP (ref 2–14)
NEUTROPHILS # BLD AUTO: 8.78 K/UL — HIGH (ref 1.8–7.4)
NEUTROPHILS NFR BLD AUTO: 80.9 % — HIGH (ref 43–77)
NITRITE UR-MCNC: POSITIVE
PH UR: 6.5 — SIGNIFICANT CHANGE UP (ref 5–8)
PLATELET # BLD AUTO: 194 K/UL — SIGNIFICANT CHANGE UP (ref 150–400)
POTASSIUM SERPL-MCNC: 4 MMOL/L — SIGNIFICANT CHANGE UP (ref 3.5–5.3)
POTASSIUM SERPL-SCNC: 4 MMOL/L — SIGNIFICANT CHANGE UP (ref 3.5–5.3)
PROT SERPL-MCNC: 7.1 GM/DL — SIGNIFICANT CHANGE UP (ref 6–8.3)
PROT UR-MCNC: 100 MG/DL
PROTHROM AB SERPL-ACNC: 16.1 SEC — HIGH (ref 10.6–13.6)
RBC # BLD: 4.13 M/UL — LOW (ref 4.2–5.8)
RBC # FLD: 13.7 % — SIGNIFICANT CHANGE UP (ref 10.3–14.5)
SARS-COV-2 RNA SPEC QL NAA+PROBE: SIGNIFICANT CHANGE UP
SODIUM SERPL-SCNC: 144 MMOL/L — SIGNIFICANT CHANGE UP (ref 135–145)
SP GR SPEC: 1.02 — SIGNIFICANT CHANGE UP (ref 1.01–1.02)
UROBILINOGEN FLD QL: 8 MG/DL
WBC # BLD: 10.85 K/UL — HIGH (ref 3.8–10.5)
WBC # FLD AUTO: 10.85 K/UL — HIGH (ref 3.8–10.5)

## 2021-03-04 PROCEDURE — 85730 THROMBOPLASTIN TIME PARTIAL: CPT

## 2021-03-04 PROCEDURE — U0003: CPT

## 2021-03-04 PROCEDURE — 99285 EMERGENCY DEPT VISIT HI MDM: CPT | Mod: CS

## 2021-03-04 PROCEDURE — 99285 EMERGENCY DEPT VISIT HI MDM: CPT | Mod: 25

## 2021-03-04 PROCEDURE — 81001 URINALYSIS AUTO W/SCOPE: CPT

## 2021-03-04 PROCEDURE — U0005: CPT

## 2021-03-04 PROCEDURE — 80053 COMPREHEN METABOLIC PANEL: CPT

## 2021-03-04 PROCEDURE — 51702 INSERT TEMP BLADDER CATH: CPT

## 2021-03-04 PROCEDURE — 85610 PROTHROMBIN TIME: CPT

## 2021-03-04 PROCEDURE — 87086 URINE CULTURE/COLONY COUNT: CPT

## 2021-03-04 PROCEDURE — 85025 COMPLETE CBC W/AUTO DIFF WBC: CPT

## 2021-03-04 PROCEDURE — 36415 COLL VENOUS BLD VENIPUNCTURE: CPT

## 2021-03-04 RX ORDER — LIDOCAINE HCL 20 MG/ML
5 VIAL (ML) INJECTION ONCE
Refills: 0 | Status: COMPLETED | OUTPATIENT
Start: 2021-03-04 | End: 2021-03-04

## 2021-03-04 RX ADMIN — Medication 5 MILLILITER(S): at 07:00

## 2021-03-04 NOTE — ED ADULT NURSE NOTE - NSFALLRSKASSESSTYPE_ED_ALL_ED
Pt. to the ED BIB Spouse C/O seizures - Pt. sent by PCP for further evaluation of Seizures that started on 6/30- Wife states he was evaluated in [facility] and was D/C home to F/U with PCP- Pt. referenced to the ED for MRI and Possible EGG- Wife denies seizure hx , but states patient has been having seizures everyday since 6/30/2018- Pt. states he had mechanical fall this morning but landed on hands- denies hitting head and LOC, but denies it was related to seizure activity and wife confirms- + cardiac hx on Coumadin-
Initial (On Arrival)
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
## 2021-03-04 NOTE — ED PROVIDER NOTE - OBJECTIVE STATEMENT
74 yo male with hx htn, a fib, on xarelto, c/o hematuria and inability to urinate today. Patient s/o 74 yo male with hx htn, a fib, on xarelto, c/o hematuria and inability to urinate today. Patient s/p urethral dilation by Dr Harrell in [place] x 2 days ago. Patient s/p case removal yesterday and unable to urinate this am with lower abdominal pain  denies nausea or vomiting  denies fever or chills  denies chest pain or sob

## 2021-03-04 NOTE — ED PROVIDER NOTE - PROGRESS NOTE DETAILS
pain has improved; patient feeling better; pinkish urine draining in evie melendez md call placed to patients urologist Dr Harrell [phone]; left message with office  -md colin d/w Urology PA, who spoke with Dr. Vidal. Dr. Vidal wants patient transferred back to primary urologist  -md colin d/w Dr. Askew at [place] who accepts patient to [facility]  -md colin

## 2021-03-04 NOTE — ED ADULT TRIAGE NOTE - CHIEF COMPLAINT QUOTE
patient status post "urolift" had Kumari removed yesterday, patient complaining of lower abdominal pain, + clots in urine. patient status post "urolift" had Kumari removed yesterday, patient complaining of lower abdominal pain, + hematuria

## 2021-03-04 NOTE — ED ADULT NURSE NOTE - OBJECTIVE STATEMENT
Patient complains of pelvic pain, urinary retention and hematuria following Kumari removal yesterday.  CBI started on arrival by prior shift RN.

## 2021-03-04 NOTE — ED ADULT NURSE NOTE - CHIEF COMPLAINT QUOTE
patient status post "urolift" had Kumari removed yesterday, patient complaining of lower abdominal pain, + hematuria

## 2021-03-05 LAB
CULTURE RESULTS: NO GROWTH — SIGNIFICANT CHANGE UP
SPECIMEN SOURCE: SIGNIFICANT CHANGE UP

## 2022-12-07 NOTE — ED PROVIDER NOTE - TEMPLATE, MLM
Validate Note Data (See Information Below): No
Type Of Destruction Used: Curettage
Size Of Lesion In Cm: 1
General
Post-Care Instructions: I reviewed with the patient in detail post-care instructions. Patient is to keep the biopsy site dry overnight, and then wash daily with a gentle cleanser. Afterwards, pat dry and apply Vaseline or Aquaphor and bandage daily until healed. For optimal wound healing, apply SPF 30+ or keep covered until pigmentation fades.
Was A Bandage Applied: Yes
Hemostasis: Drysol
Biopsy Type: H and E
Detail Level: Detailed
Billing Type: Third-Party Bill
Silver Nitrate Text: The wound bed was treated with silver nitrate after the biopsy was performed.
X Size Of Lesion In Cm: 0
Biopsy Method: Dermablade
Electrodesiccation And Curettage Text: The wound bed was treated with electrodesiccation and curettage after the biopsy was performed.
Information: Selecting Yes will display possible errors in your note based on the variables you have selected. This validation is only offered as a suggestion for you. PLEASE NOTE THAT THE VALIDATION TEXT WILL BE REMOVED WHEN YOU FINALIZE YOUR NOTE. IF YOU WANT TO FAX A PRELIMINARY NOTE YOU WILL NEED TO TOGGLE THIS TO 'NO' IF YOU DO NOT WANT IT IN YOUR FAXED NOTE.
Cryotherapy Text: The wound bed was treated with cryotherapy after the biopsy was performed.
Notification Instructions: Patient will be notified of biopsy results. However, patient instructed to call the office if not contacted within 2 weeks.
Depth Of Biopsy: dermis
Wound Care: Petrolatum
Dressing: bandage
Anesthesia Type: 1% lidocaine with epinephrine
Anesthesia Volume In Cc (Will Not Render If 0): 0.5
Curettage Text: The wound bed was treated with curettage after the biopsy was performed.
Consent: Written consent was obtained and risks were reviewed. Patient was offered the consent document and offered time to review and ask any questions prior to signing.
Body Location Override (Optional - Billing Will Still Be Based On Selected Body Map Location If Applicable): left anterior medial upper arm
Electrodesiccation Text: The wound bed was treated with electrodesiccation after the biopsy was performed.

## 2023-07-14 NOTE — ED ADULT NURSE NOTE - TEMPLATE LIST FOR HEAD TO TOE ASSESSMENT
Airway    Performed by: Rick Pickard DO  Authorized by: Rick Pickard DO    Final Airway Type:  Supraglottic airway  Final Supraglottic Airway:  IGel  SGA Size*:  4  Attempts*:  1   Patient Identified, Procedure confirmed, Emergency equipment available and Safety protocols followed  Location:  OR  Urgency:  Elective  Difficult Airway: No    Indications for Airway Management:  Anesthesia  Sedation Level:  Deep  Mask Difficulty Assessment:  1 - vent by mask      
General